# Patient Record
Sex: MALE | Race: BLACK OR AFRICAN AMERICAN | NOT HISPANIC OR LATINO | URBAN - METROPOLITAN AREA
[De-identification: names, ages, dates, MRNs, and addresses within clinical notes are randomized per-mention and may not be internally consistent; named-entity substitution may affect disease eponyms.]

---

## 2020-10-20 ENCOUNTER — LAB VISIT (OUTPATIENT)
Dept: URGENT CARE | Facility: CLINIC | Age: 55
End: 2020-10-20
Payer: COMMERCIAL

## 2020-10-20 DIAGNOSIS — U07.1 COVID-19: Primary | ICD-10-CM

## 2020-10-20 PROCEDURE — U0003 INFECTIOUS AGENT DETECTION BY NUCLEIC ACID (DNA OR RNA); SEVERE ACUTE RESPIRATORY SYNDROME CORONAVIRUS 2 (SARS-COV-2) (CORONAVIRUS DISEASE [COVID-19]), AMPLIFIED PROBE TECHNIQUE, MAKING USE OF HIGH THROUGHPUT TECHNOLOGIES AS DESCRIBED BY CMS-2020-01-R: HCPCS

## 2020-10-21 LAB — SARS-COV-2 RNA RESP QL NAA+PROBE: NOT DETECTED

## 2020-12-14 ENCOUNTER — LAB VISIT (OUTPATIENT)
Dept: PRIMARY CARE CLINIC | Facility: CLINIC | Age: 55
End: 2020-12-14
Payer: COMMERCIAL

## 2020-12-14 DIAGNOSIS — Z20.822 EXPOSURE TO COVID-19 VIRUS: Primary | ICD-10-CM

## 2020-12-14 DIAGNOSIS — R05.9 COUGH: ICD-10-CM

## 2020-12-14 PROCEDURE — U0003 INFECTIOUS AGENT DETECTION BY NUCLEIC ACID (DNA OR RNA); SEVERE ACUTE RESPIRATORY SYNDROME CORONAVIRUS 2 (SARS-COV-2) (CORONAVIRUS DISEASE [COVID-19]), AMPLIFIED PROBE TECHNIQUE, MAKING USE OF HIGH THROUGHPUT TECHNOLOGIES AS DESCRIBED BY CMS-2020-01-R: HCPCS

## 2020-12-15 LAB — SARS-COV-2 RNA RESP QL NAA+PROBE: NOT DETECTED

## 2021-04-26 ENCOUNTER — PATIENT MESSAGE (OUTPATIENT)
Dept: RESEARCH | Facility: HOSPITAL | Age: 56
End: 2021-04-26

## 2021-08-11 ENCOUNTER — CLINICAL SUPPORT (OUTPATIENT)
Dept: URGENT CARE | Facility: CLINIC | Age: 56
End: 2021-08-11
Payer: COMMERCIAL

## 2021-08-11 DIAGNOSIS — Z20.822 EXPOSURE TO COVID-19 VIRUS: Primary | ICD-10-CM

## 2021-08-11 LAB
CTP QC/QA: YES
SARS-COV-2 RDRP RESP QL NAA+PROBE: NEGATIVE

## 2021-08-11 PROCEDURE — U0002: ICD-10-PCS | Mod: QW,S$GLB,, | Performed by: FAMILY MEDICINE

## 2021-08-11 PROCEDURE — U0002 COVID-19 LAB TEST NON-CDC: HCPCS | Mod: QW,S$GLB,, | Performed by: FAMILY MEDICINE

## 2021-10-13 ENCOUNTER — CLINICAL SUPPORT (OUTPATIENT)
Dept: URGENT CARE | Facility: CLINIC | Age: 56
End: 2021-10-13
Payer: COMMERCIAL

## 2021-10-13 DIAGNOSIS — Z20.822 ENCOUNTER FOR LABORATORY TESTING FOR COVID-19 VIRUS: ICD-10-CM

## 2021-10-13 LAB — SARS-COV-2 RNA RESP QL NAA+PROBE: NOT DETECTED

## 2021-10-13 PROCEDURE — U0003 INFECTIOUS AGENT DETECTION BY NUCLEIC ACID (DNA OR RNA); SEVERE ACUTE RESPIRATORY SYNDROME CORONAVIRUS 2 (SARS-COV-2) (CORONAVIRUS DISEASE [COVID-19]), AMPLIFIED PROBE TECHNIQUE, MAKING USE OF HIGH THROUGHPUT TECHNOLOGIES AS DESCRIBED BY CMS-2020-01-R: HCPCS | Performed by: PHYSICIAN ASSISTANT

## 2021-10-13 PROCEDURE — U0005 INFEC AGEN DETEC AMPLI PROBE: HCPCS | Performed by: PHYSICIAN ASSISTANT

## 2021-11-09 ENCOUNTER — CLINICAL SUPPORT (OUTPATIENT)
Dept: URGENT CARE | Facility: CLINIC | Age: 56
End: 2021-11-09
Payer: COMMERCIAL

## 2021-11-09 DIAGNOSIS — U07.1 COVID-19: Primary | ICD-10-CM

## 2021-11-09 LAB — SARS-COV-2 RNA RESP QL NAA+PROBE: NOT DETECTED

## 2021-11-09 PROCEDURE — U0003 INFECTIOUS AGENT DETECTION BY NUCLEIC ACID (DNA OR RNA); SEVERE ACUTE RESPIRATORY SYNDROME CORONAVIRUS 2 (SARS-COV-2) (CORONAVIRUS DISEASE [COVID-19]), AMPLIFIED PROBE TECHNIQUE, MAKING USE OF HIGH THROUGHPUT TECHNOLOGIES AS DESCRIBED BY CMS-2020-01-R: HCPCS | Performed by: FAMILY MEDICINE

## 2021-11-09 PROCEDURE — U0005 INFEC AGEN DETEC AMPLI PROBE: HCPCS | Performed by: FAMILY MEDICINE

## 2022-04-04 ENCOUNTER — TELEPHONE (OUTPATIENT)
Dept: UROLOGY | Facility: CLINIC | Age: 57
End: 2022-04-04
Payer: COMMERCIAL

## 2022-04-04 DIAGNOSIS — Z12.5 ENCOUNTER FOR PROSTATE CANCER SCREENING: Primary | ICD-10-CM

## 2023-08-15 ENCOUNTER — HOSPITAL ENCOUNTER (EMERGENCY)
Facility: HOSPITAL | Age: 58
Discharge: HOME | End: 2023-08-16
Attending: EMERGENCY MEDICINE
Payer: COMMERCIAL

## 2023-08-15 DIAGNOSIS — R07.9 CHEST PAIN: ICD-10-CM

## 2023-08-15 LAB
BASOPHILS # BLD AUTO: 0.1 K/UL (ref 0–0.2)
BASOPHILS NFR BLD: 1.3 % (ref 0–1.9)
DIFFERENTIAL METHOD: ABNORMAL
EOSINOPHIL # BLD AUTO: 0.2 K/UL (ref 0–0.5)
EOSINOPHIL NFR BLD: 1.9 % (ref 0–8)
ERYTHROCYTE [DISTWIDTH] IN BLOOD BY AUTOMATED COUNT: 13.2 % (ref 11.5–14.5)
HCT VFR BLD AUTO: 37 % (ref 40–54)
HGB BLD-MCNC: 12.3 G/DL (ref 14–18)
IMM GRANULOCYTES # BLD AUTO: 0.02 K/UL (ref 0–0.04)
IMM GRANULOCYTES NFR BLD AUTO: 0.3 % (ref 0–0.5)
LYMPHOCYTES # BLD AUTO: 3.2 K/UL (ref 1–4.8)
LYMPHOCYTES NFR BLD: 40.2 % (ref 18–48)
MCH RBC QN AUTO: 34.6 PG (ref 27–31)
MCHC RBC AUTO-ENTMCNC: 33.2 G/DL (ref 32–36)
MCV RBC AUTO: 104 FL (ref 82–98)
MONOCYTES # BLD AUTO: 0.6 K/UL (ref 0.3–1)
MONOCYTES NFR BLD: 7.4 % (ref 4–15)
NEUTROPHILS # BLD AUTO: 3.9 K/UL (ref 1.8–7.7)
NEUTROPHILS NFR BLD: 48.9 % (ref 38–73)
NRBC BLD-RTO: 0 /100 WBC
PLATELET # BLD AUTO: 235 K/UL (ref 150–450)
PMV BLD AUTO: 9 FL (ref 9.2–12.9)
POC CARDIAC TROPONIN I: 0 NG/ML (ref 0–0.08)
RBC # BLD AUTO: 3.56 M/UL (ref 4.6–6.2)
SAMPLE: NORMAL
WBC # BLD AUTO: 7.99 K/UL (ref 3.9–12.7)

## 2023-08-15 PROCEDURE — 99285 EMERGENCY DEPT VISIT HI MDM: CPT | Mod: 25

## 2023-08-15 PROCEDURE — 85025 COMPLETE CBC W/AUTO DIFF WBC: CPT

## 2023-08-15 PROCEDURE — 86803 HEPATITIS C AB TEST: CPT | Performed by: PHYSICIAN ASSISTANT

## 2023-08-15 PROCEDURE — 80053 COMPREHEN METABOLIC PANEL: CPT

## 2023-08-15 PROCEDURE — 87389 HIV-1 AG W/HIV-1&-2 AB AG IA: CPT | Performed by: PHYSICIAN ASSISTANT

## 2023-08-15 PROCEDURE — 84484 ASSAY OF TROPONIN QUANT: CPT

## 2023-08-15 PROCEDURE — 25000003 PHARM REV CODE 250

## 2023-08-15 PROCEDURE — 83880 ASSAY OF NATRIURETIC PEPTIDE: CPT

## 2023-08-15 PROCEDURE — 93005 ELECTROCARDIOGRAM TRACING: CPT

## 2023-08-15 PROCEDURE — 93010 ELECTROCARDIOGRAM REPORT: CPT | Mod: ,,, | Performed by: INTERNAL MEDICINE

## 2023-08-15 PROCEDURE — 93010 EKG 12-LEAD: ICD-10-PCS | Mod: ,,, | Performed by: INTERNAL MEDICINE

## 2023-08-15 RX ORDER — ASPIRIN 325 MG
325 TABLET ORAL
Status: COMPLETED | OUTPATIENT
Start: 2023-08-15 | End: 2023-08-15

## 2023-08-15 RX ADMIN — ASPIRIN 325 MG ORAL TABLET 325 MG: 325 PILL ORAL at 11:08

## 2023-08-16 VITALS
HEART RATE: 88 BPM | DIASTOLIC BLOOD PRESSURE: 69 MMHG | OXYGEN SATURATION: 99 % | WEIGHT: 260 LBS | RESPIRATION RATE: 16 BRPM | SYSTOLIC BLOOD PRESSURE: 127 MMHG | TEMPERATURE: 98 F

## 2023-08-16 LAB
ALBUMIN SERPL BCP-MCNC: 3.7 G/DL (ref 3.5–5.2)
ALP SERPL-CCNC: 97 U/L (ref 55–135)
ALT SERPL W/O P-5'-P-CCNC: 91 U/L (ref 10–44)
ANION GAP SERPL CALC-SCNC: 17 MMOL/L (ref 8–16)
AST SERPL-CCNC: 137 U/L (ref 10–40)
BILIRUB SERPL-MCNC: 0.7 MG/DL (ref 0.1–1)
BNP SERPL-MCNC: 38 PG/ML (ref 0–99)
BUN SERPL-MCNC: 15 MG/DL (ref 6–20)
CALCIUM SERPL-MCNC: 9.4 MG/DL (ref 8.7–10.5)
CHLORIDE SERPL-SCNC: 105 MMOL/L (ref 95–110)
CO2 SERPL-SCNC: 21 MMOL/L (ref 23–29)
CREAT SERPL-MCNC: 1.5 MG/DL (ref 0.5–1.4)
EST. GFR  (NO RACE VARIABLE): 53.3 ML/MIN/1.73 M^2
GLUCOSE SERPL-MCNC: 116 MG/DL (ref 70–110)
HCV AB SERPL QL IA: NORMAL
HIV 1+2 AB+HIV1 P24 AG SERPL QL IA: NORMAL
POTASSIUM SERPL-SCNC: 4 MMOL/L (ref 3.5–5.1)
PROT SERPL-MCNC: 8.1 G/DL (ref 6–8.4)
SODIUM SERPL-SCNC: 143 MMOL/L (ref 136–145)
TROPONIN I SERPL DL<=0.01 NG/ML-MCNC: 0.01 NG/ML (ref 0–0.03)
TROPONIN I SERPL DL<=0.01 NG/ML-MCNC: <0.006 NG/ML (ref 0–0.03)

## 2023-08-16 PROCEDURE — 84484 ASSAY OF TROPONIN QUANT: CPT

## 2023-08-16 PROCEDURE — 25000003 PHARM REV CODE 250

## 2023-08-16 PROCEDURE — 96360 HYDRATION IV INFUSION INIT: CPT

## 2023-08-16 PROCEDURE — 96361 HYDRATE IV INFUSION ADD-ON: CPT

## 2023-08-16 RX ADMIN — SODIUM CHLORIDE 1000 ML: 0.9 INJECTION, SOLUTION INTRAVENOUS at 12:08

## 2023-08-16 NOTE — DISCHARGE INSTRUCTIONS

## 2023-08-16 NOTE — EKG INTERPRETATIONS - EMERGENCY DEPT.
Independently interpreted by MD:  Rate 90, NSR, no stemi, no ectopy, no hypertrophy, 1st degree AV block, poor qrs progression in V3-4

## 2023-08-16 NOTE — ED PROVIDER NOTES
Encounter Date: 8/15/2023       History     Chief Complaint   Patient presents with    Chest Pain     Midsternal chest pain occurred 2 hours ago. Pt in no current pain. -SOB.       57-year-old male with no known medical history presents emergency department  with episode of mid sternal chest discomfort lasting 10 minutes and self resolving  2 hours prior to arrival.  Patient reports experiencing symptoms of indigestion with belching and flatus at the time.   Patient reports telling a friend of his who recommended he presents to the emergency department.  Patient reports he was driving his car when symptoms onset occurred.  He denies experiencing associated shortness a breath, nausea, vomiting diaphoresis or abdominal discomfort.  Patient presents to the ED asymptomatic      Review of patient's allergies indicates:  No Known Allergies  No past medical history on file.  Past Surgical History:   Procedure Laterality Date    KNEE ARTHROPLASTY Right 09/1998     No family history on file.  Social History     Tobacco Use    Smoking status: Never    Smokeless tobacco: Never   Substance Use Topics    Alcohol use: Yes     Alcohol/week: 12.0 standard drinks of alcohol     Types: 10 Glasses of wine, 2 Shots of liquor per week    Drug use: Never     Review of Systems   Constitutional:  Negative for fever.   Respiratory:  Negative for cough, chest tightness and shortness of breath.    Cardiovascular:  Positive for chest pain.   Gastrointestinal:  Negative for abdominal pain.   Musculoskeletal:  Negative for arthralgias.   Skin:  Negative for color change.   Allergic/Immunologic: Negative for immunocompromised state.       Physical Exam     Initial Vitals [08/15/23 2238]   BP Pulse Resp Temp SpO2   (!) 158/89 90 16 97.7 °F (36.5 °C) 100 %      MAP       --         Physical Exam    Vitals reviewed.  Constitutional: He appears well-developed and well-nourished.   HENT:   Head: Normocephalic and atraumatic.   Eyes: Conjunctivae and  EOM are normal.   Neck:   Normal range of motion.  Cardiovascular:  Normal rate and normal heart sounds.           Pulmonary/Chest: Breath sounds normal. No respiratory distress. He has no wheezes. He has no rales.   Abdominal: Abdomen is soft. He exhibits no distension. There is no abdominal tenderness. There is no rebound.   Musculoskeletal:         General: Normal range of motion.      Cervical back: Normal range of motion.     Neurological: He is alert and oriented to person, place, and time.   Skin: Skin is warm and dry.   Psychiatric: He has a normal mood and affect. Thought content normal.         ED Course   Procedures  Labs Reviewed   CBC W/ AUTO DIFFERENTIAL - Abnormal; Notable for the following components:       Result Value    RBC 3.56 (*)     Hemoglobin 12.3 (*)     Hematocrit 37.0 (*)      (*)     MCH 34.6 (*)     MPV 9.0 (*)     All other components within normal limits    Narrative:     Release to patient->Immediate   COMPREHENSIVE METABOLIC PANEL - Abnormal; Notable for the following components:    CO2 21 (*)     Glucose 116 (*)     Creatinine 1.5 (*)      (*)     ALT 91 (*)     eGFR 53.3 (*)     Anion Gap 17 (*)     All other components within normal limits    Narrative:     Release to patient->Immediate   TROPONIN I    Narrative:     Release to patient->Immediate   B-TYPE NATRIURETIC PEPTIDE    Narrative:     Release to patient->Immediate   TROPONIN ISTAT   HIV 1 / 2 ANTIBODY   HEPATITIS C ANTIBODY   POCT TROPONIN   POCT TROPONIN          Imaging Results              X-Ray Chest AP Portable (Final result)  Result time 08/16/23 00:18:20      Final result by Cyril Wing MD (08/16/23 00:18:20)                   Impression:      No evidence of acute chest disease radiographically.      Electronically signed by: Cyril Wing  Date:    08/16/2023  Time:    00:18               Narrative:    EXAMINATION:  XR CHEST AP PORTABLE    CLINICAL HISTORY:  Chest  Pain;    TECHNIQUE:  Single frontal view of the chest was performed.    COMPARISON:  None    FINDINGS:  The heart is not enlarged the trachea is midline.  The lungs pleural spaces appear clear.  Metallic opacity projects over the lower cervical spine etiology unclear.  Bony structures are intact.                                       Medications   aspirin tablet 325 mg (325 mg Oral Given 8/15/23 2328)     Medical Decision Making:   Differential Diagnosis:    GERD, ACS no concern of arrhythmia ,  PE  also not consistent with presentation  Clinical Tests:   Lab Tests: Ordered  Radiological Study: Ordered  ED Management:   57-year-old male presents emergency department due to midsternal chest discomfort that is since resolved.  Labs and imaging ordered.  Unclear if patient follow up with primary care  however no documented comorbidity. Patient does appear to have been drinking alcohol earlier today  today's process  could possibly be caused by GERD.   Patient is not experiencing chest discomfort in emergency department   EKG performed shows sinus rhythm with first-degree block,  nonspecific inverted T-waves seen in lead 3.  We will trend 2 troponin due to recent onset of discomfort.  Initial troponin WNL    I am signing out patient to  night physician pending dispo and 2nd troponin  results               ED Course as of 08/16/23 0020   Wed Aug 16, 2023   0017 Troponin I #1 [CR]      ED Course User Index  [CR] Marlene Rojas PA-C                 Clinical Impression:   Final diagnoses:  [R07.9] Chest pain               Marlene Rojas PA-C  08/16/23 0020

## 2023-08-16 NOTE — ED PROVIDER NOTES
I assumed care of this patient from the outgoing provider at shift change.  At that time patient was pending a repeat troponin which was within normal ranges.  Patient has remained chest pain-free while in the ED. vital signs normal, patient is stable for discharge, advised to follow-up with PCP     Alex Bledsoe PA-C  08/16/23 [id number]

## 2023-08-16 NOTE — ED TRIAGE NOTES
Martín Thomason, a 57 y.o. male presents to the ED w/ complaint of midsternal chest pain that started about an hour ago. Pt states that the pain has subsided after he passed gas but wanted to come get checked out anyway.    Triage note:  Chief Complaint   Patient presents with    Chest Pain     Midsternal chest pain occurred 2 hours ago. Pt in no current pain. -SOB.      Review of patient's allergies indicates:  Not on File  No past medical history on file.     Patient identifiers for Martín Thomason checked and correct.  LOC: Patient is awake, alert, and aware of environment with an appropriate affect. Patient is oriented x 3 and speaking appropriately.  APPEARANCE: Patient resting comfortably and in no acute distress. Patient is clean and well groomed, patient's clothing is properly fastened.  SKIN: The skin is warm and dry. Patient has normal skin turgor and moist mucus membrances. Skin is intact; no bruising or breakdown noted.  MUSKULOSKELETAL: Patient is moving all extremities well, no obvious deformities noted. Pulses intact.   RESPIRATORY: Airway is open and patent. Respirations are spontaneous and non-labored with normal effort and rate.  CARDIAC: Patient has a normal rate and rhythm. No peripheral edema noted. Capillary refill < 3 seconds. Pt states that he had midsternal chest pain that has now subsided.  ABDOMEN: No distention noted. Bowel sounds active in all 4 quadrants. Soft and non-tender upon palpation.  NEUROLOGICAL: PERRL. Facial expression is symmetrical. Hand grasps are equal bilaterally. Normal sensation in all extremities when touched with finger.  Allergies reported: Review of patient's allergies indicates:  No Known Allergies

## 2023-11-27 ENCOUNTER — OFFICE VISIT (OUTPATIENT)
Dept: URGENT CARE | Facility: CLINIC | Age: 58
End: 2023-11-27
Payer: COMMERCIAL

## 2023-11-27 VITALS
TEMPERATURE: 98 F | HEART RATE: 110 BPM | SYSTOLIC BLOOD PRESSURE: 150 MMHG | DIASTOLIC BLOOD PRESSURE: 100 MMHG | WEIGHT: 282.44 LBS | RESPIRATION RATE: 20 BRPM | OXYGEN SATURATION: 97 %

## 2023-11-27 DIAGNOSIS — K42.9 UMBILICAL HERNIA WITHOUT OBSTRUCTION AND WITHOUT GANGRENE: ICD-10-CM

## 2023-11-27 DIAGNOSIS — R25.1 SHAKINESS: ICD-10-CM

## 2023-11-27 DIAGNOSIS — R00.0 TACHYCARDIA: ICD-10-CM

## 2023-11-27 DIAGNOSIS — R11.10 VOMITING IN ADULT: ICD-10-CM

## 2023-11-27 DIAGNOSIS — J06.9 VIRAL URI WITH COUGH: ICD-10-CM

## 2023-11-27 DIAGNOSIS — R42 VERTIGO: Primary | ICD-10-CM

## 2023-11-27 LAB
CTP QC/QA: YES
CTP QC/QA: YES
GLUCOSE SERPL-MCNC: 148 MG/DL (ref 70–110)
POC MOLECULAR INFLUENZA A AGN: NEGATIVE
POC MOLECULAR INFLUENZA B AGN: NEGATIVE
SARS-COV-2 AG RESP QL IA.RAPID: NEGATIVE

## 2023-11-27 PROCEDURE — 87502 POCT INFLUENZA A/B MOLECULAR: ICD-10-PCS | Mod: QW,S$GLB,, | Performed by: NURSE PRACTITIONER

## 2023-11-27 PROCEDURE — 99204 OFFICE O/P NEW MOD 45 MIN: CPT | Mod: S$GLB,,, | Performed by: NURSE PRACTITIONER

## 2023-11-27 PROCEDURE — 87811 SARS-COV-2 COVID19 W/OPTIC: CPT | Mod: QW,S$GLB,, | Performed by: NURSE PRACTITIONER

## 2023-11-27 PROCEDURE — 87811 SARS CORONAVIRUS 2 ANTIGEN POCT, MANUAL READ: ICD-10-PCS | Mod: QW,S$GLB,, | Performed by: NURSE PRACTITIONER

## 2023-11-27 PROCEDURE — 99204 PR OFFICE/OUTPT VISIT, NEW, LEVL IV, 45-59 MIN: ICD-10-PCS | Mod: S$GLB,,, | Performed by: NURSE PRACTITIONER

## 2023-11-27 PROCEDURE — 82962 POCT GLUCOSE, HAND-HELD DEVICE: ICD-10-PCS | Mod: S$GLB,,, | Performed by: NURSE PRACTITIONER

## 2023-11-27 PROCEDURE — 87502 INFLUENZA DNA AMP PROBE: CPT | Mod: QW,S$GLB,, | Performed by: NURSE PRACTITIONER

## 2023-11-27 PROCEDURE — 82962 GLUCOSE BLOOD TEST: CPT | Mod: S$GLB,,, | Performed by: NURSE PRACTITIONER

## 2023-11-27 RX ORDER — PROMETHAZINE HYDROCHLORIDE 25 MG/1
25 TABLET ORAL EVERY 8 HOURS PRN
Qty: 30 TABLET | Refills: 0 | Status: SHIPPED | OUTPATIENT
Start: 2023-11-27

## 2023-11-27 RX ORDER — SODIUM CHLORIDE 9 MG/ML
INJECTION, SOLUTION INTRAVENOUS ONCE
Status: COMPLETED | OUTPATIENT
Start: 2023-11-27 | End: 2023-11-27

## 2023-11-27 RX ORDER — BENZONATATE 200 MG/1
200 CAPSULE ORAL EVERY 8 HOURS PRN
Qty: 30 CAPSULE | Refills: 0 | Status: SHIPPED | OUTPATIENT
Start: 2023-11-27

## 2023-11-27 RX ORDER — HYDROCHLOROTHIAZIDE 12.5 MG/1
12.5 TABLET ORAL DAILY
COMMUNITY

## 2023-11-27 RX ADMIN — SODIUM CHLORIDE 1000 ML: 9 INJECTION, SOLUTION INTRAVENOUS at 12:11

## 2023-11-27 NOTE — PROGRESS NOTES
Subjective:      Patient ID: Martín Thomason is a 57 y.o. male.    Vitals:  weight is 128.1 kg (282 lb 6.6 oz). His temperature is 98 °F (36.7 °C). His blood pressure is 150/100 (abnormal) and his pulse is 110. His respiration is 20 and oxygen saturation is 97%.     Chief Complaint: Emesis    57 yr old male presents to  today with c/o sore throat, vomiting, shakiness, and chills. Pt stated that his symptoms began five days ago. He vomited x1, but very large in amount. Denies chest pain and back pain. Denies abdominal pain. Reports abdominal hernia that does not hurt him, and that he is monitoring. Elevated HP and BP noted. He states that he did not take his BP medicine today.  Denies confusion, blurry vision, and headache. Denies diarrhea.  Dizziness worsens when he lies down.     Emesis   This is a new problem. The current episode started today. The problem occurs less than 2 times per day. The problem has been resolved. There has been no fever. Associated symptoms include chills, coughing, dizziness, headaches and URI. Pertinent negatives include no abdominal pain, chest pain, diarrhea, fever or sweats. Treatments tried: musinex.       Constitution: Positive for chills. Negative for fever.   HENT:  Positive for sore throat. Negative for trouble swallowing.    Cardiovascular:  Negative for chest pain.   Respiratory:  Positive for cough. Negative for shortness of breath and wheezing.    Gastrointestinal:  Positive for nausea and vomiting. Negative for abdominal pain and diarrhea.   Genitourinary:  Negative for dysuria and flank pain.   Musculoskeletal:  Negative for back pain.   Skin:  Negative for rash.   Neurological:  Positive for dizziness, light-headedness and headaches. Negative for history of vertigo, passing out, facial drooping, speech difficulty, coordination disturbances, loss of balance, history of migraines, disorientation, altered mental status, loss of consciousness, numbness, tingling and  seizures.   Psychiatric/Behavioral:  Negative for altered mental status, disorientation, confusion and agitation.       Objective:     Physical Exam   Constitutional: He is oriented to person, place, and time.  Non-toxic appearance. He does not appear ill. No distress.      Comments:Good activity level. NAD. Appears stable.      HENT:   Head: Normocephalic.   Ears:   Right Ear: Tympanic membrane, external ear and ear canal normal.   Left Ear: Tympanic membrane, external ear and ear canal normal.   Nose: Nose normal.   Mouth/Throat: Mucous membranes are moist. No oropharyngeal exudate or posterior oropharyngeal erythema. Oropharynx is clear.   Eyes: Right eye exhibits no discharge. Left eye exhibits no discharge.   Neck: Neck supple. No neck rigidity present.   Cardiovascular: Regular rhythm. Tachycardia present.   Pulmonary/Chest: Effort normal and breath sounds normal. No stridor. No respiratory distress. He has no wheezes. He has no rhonchi.   Abdominal: Normal appearance. He exhibits distension. There is no abdominal tenderness. There is no guarding, no left CVA tenderness and no right CVA tenderness. A hernia is present.   Musculoskeletal: Normal range of motion.         General: Normal range of motion.   Neurological: He is alert and oriented to person, place, and time. GCS eye subscore is 4. GCS verbal subscore is 5. GCS motor subscore is 6.   Skin: Skin is warm, dry, not diaphoretic, not pale and no rash. Capillary refill takes less than 2 seconds.   Psychiatric: His behavior is normal. Mood normal.   Nursing note and vitals reviewed.      EKG: sinus tachycardia    Orthostatic VS results:   Lyin/107, 121  Standin/92, 125  Continuance of Standin/100, 110     Results for orders placed or performed in visit on 23   POCT Influenza A/B MOLECULAR   Result Value Ref Range    POC Molecular Influenza A Ag Negative Negative, Not Reported    POC Molecular Influenza B Ag Negative Negative, Not  Reported     Acceptable Yes    SARS Coronavirus 2 Antigen, POCT Manual Read   Result Value Ref Range    SARS Coronavirus 2 Antigen Negative Negative     Acceptable Yes    POCT Glucose, Hand-Held Device   Result Value Ref Range    POC Glucose 148 (A) 70 - 110 MG/DL        Assessment:     1. Vertigo    2. Viral URI with cough    3. Shakiness    4. Vomiting in adult    5. Umbilical hernia without obstruction and without gangrene    6. Tachycardia        EKG: sinus tachycardia     Plan:   Pt feels better after 1L NS bolus     Vertigo  -     IN OFFICE EKG 12-LEAD (to Muse)  -     Orthostatic vital signs  -     Ambulatory referral/consult to Internal Medicine  -     promethazine (PHENERGAN) 25 MG tablet; Take 1 tablet (25 mg total) by mouth every 8 (eight) hours as needed (nausea, vomiting, or dizziness).  Dispense: 30 tablet; Refill: 0  -     0.9%  NaCl infusion    Viral URI with cough  -     POCT Influenza A/B MOLECULAR  -     SARS Coronavirus 2 Antigen, POCT Manual Read  -     Ambulatory referral/consult to Internal Medicine  -     benzonatate (TESSALON) 200 MG capsule; Take 1 capsule (200 mg total) by mouth every 8 (eight) hours as needed for Cough.  Dispense: 30 capsule; Refill: 0  -     promethazine (PHENERGAN) 25 MG tablet; Take 1 tablet (25 mg total) by mouth every 8 (eight) hours as needed (nausea, vomiting, or dizziness).  Dispense: 30 tablet; Refill: 0    Shakiness  -     POCT Glucose, Hand-Held Device  -     IN OFFICE EKG 12-LEAD (to Muse)  -     Orthostatic vital signs  -     Ambulatory referral/consult to Internal Medicine  -     0.9%  NaCl infusion    Vomiting in adult  -     POCT Glucose, Hand-Held Device  -     Ambulatory referral/consult to Internal Medicine  -     promethazine (PHENERGAN) 25 MG tablet; Take 1 tablet (25 mg total) by mouth every 8 (eight) hours as needed (nausea, vomiting, or dizziness).  Dispense: 30 tablet; Refill: 0  -     0.9%  NaCl infusion    Umbilical  hernia without obstruction and without gangrene  -     Ambulatory referral/consult to Internal Medicine    Tachycardia  -     POCT Glucose, Hand-Held Device  -     IN OFFICE EKG 12-LEAD (to Muse)  -     Orthostatic vital signs  -     Ambulatory referral/consult to Internal Medicine  -     Ambulatory referral/consult to Cardiology  -     0.9%  NaCl infusion      Patient Instructions   Seek ER care if your symptoms worsen   Please follow up with a primary care doctor  Monitor your blood pressure and heart rate at home. If your blood pressure gets above 180 for top number and/or 120 for bottom number, seek ER care  If your hear rate remains elevated (above 100) after rehydrating yourself, and after pain is controlled (sore throat and cold symptoms), seek ER care     A referral for primary care and cardiology have been placed.   [facility] should call you to set up appointments. If you do not hear from [facility], please call [phone] to make both appointments   Remain compliant with blood pressure medicaitons     Tylenol for pain (for sore throat)   Oral fluids, (hot tea with honey helps with sore throat and cough)  Rest  Change positions slowly to help decrease dizziness

## 2023-11-27 NOTE — PATIENT INSTRUCTIONS
Seek ER care if your symptoms worsen   Please follow up with a primary care doctor  Monitor your blood pressure and heart rate at home. If your blood pressure gets above 180 for top number and/or 120 for bottom number, seek ER care  If your hear rate remains elevated (above 100) after rehydrating yourself, and after pain is controlled (sore throat and cold symptoms), seek ER care     A referral for primary care and cardiology have been placed.   [facility] should call you to set up appointments. If you do not hear from [facility], please call [phone] to make both appointments   Remain compliant with blood pressure medicaitons     Tylenol for pain (for sore throat)   Oral fluids, (hot tea with honey helps with sore throat and cough)  Rest  Change positions slowly to help decrease dizziness

## 2023-11-27 NOTE — PROGRESS NOTES
Subjective:      Patient ID: Martín Thomason is a 57 y.o. male.    Vitals:  vitals were not taken for this visit.     Chief Complaint: Emesis    57 yr old pt presents vomiting/chills     Emesis     Gastrointestinal:  Positive for vomiting.    Objective:     Physical Exam    Assessment:     No diagnosis found.    Plan:       There are no diagnoses linked to this encounter.

## 2023-12-09 ENCOUNTER — ON-DEMAND VIRTUAL (OUTPATIENT)
Dept: URGENT CARE | Facility: CLINIC | Age: 58
End: 2023-12-09
Payer: COMMERCIAL

## 2023-12-09 DIAGNOSIS — Z20.828 EXPOSURE TO INFLUENZA: Primary | ICD-10-CM

## 2023-12-09 DIAGNOSIS — Z78.9 ANTIVIRAL PROPHYLAXIS RECOMMENDED: ICD-10-CM

## 2023-12-09 PROCEDURE — 99213 OFFICE O/P EST LOW 20 MIN: CPT | Mod: 95,S$GLB,, | Performed by: NURSE PRACTITIONER

## 2023-12-09 PROCEDURE — 99213 PR OFFICE/OUTPT VISIT, EST, LEVL III, 20-29 MIN: ICD-10-PCS | Mod: 95,S$GLB,, | Performed by: NURSE PRACTITIONER

## 2023-12-09 RX ORDER — OSELTAMIVIR PHOSPHATE 75 MG/1
75 CAPSULE ORAL DAILY
Qty: 7 CAPSULE | Refills: 0 | Status: SHIPPED | OUTPATIENT
Start: 2023-12-09 | End: 2023-12-16

## 2023-12-09 NOTE — PROGRESS NOTES
Subjective:      Patient ID: Martín Thomason is a 57 y.o. male.    Vitals:  vitals were not taken for this visit.     Chief Complaint: Influenza (Wife tested positive for influenza)    57 year old male presents via telehealth with request for flu medication. Reports wife tested positive for influenza.         Constitution: Negative for chills and fever.   HENT:  Negative for congestion and postnasal drip.    Respiratory:  Positive for cough.       Objective:     Physical Exam   Constitutional: He is oriented to person, place, and time. No distress.   HENT:   Head: Normocephalic and atraumatic.   Mouth/Throat: Oropharynx is clear and moist and mucous membranes are normal.   Eyes: No scleral icterus. Extraocular movement intact   Pulmonary/Chest: Effort normal. No respiratory distress.   Musculoskeletal: Normal range of motion.         General: Normal range of motion.   Neurological: He is alert and oriented to person, place, and time.   Skin: Skin is not diaphoretic.   Psychiatric: His behavior is normal. Judgment and thought content normal.   Vitals reviewed.      Assessment:     1. Exposure to influenza    2. Antiviral prophylaxis recommended        Plan:       Exposure to influenza  -     oseltamivir (TAMIFLU) 75 MG capsule; Take 1 capsule (75 mg total) by mouth once daily. for 7 days  Dispense: 7 capsule; Refill: 0    Antiviral prophylaxis recommended      Patient Instructions   Please drink plenty of fluids.  Please get plenty of rest.  Please return here or go to the Emergency Department for any concerns or worsening of condition.  Tamiflu prescription has been discussed and prescribed, please take to completion unless you cannot tolerate the side effects.   It is okay to take plain over the counter Mucinex or Allegra or Claritin or Zyrtec.    If you do have Hypertension or palpitations, it is safe to take Coricidin HBP for relief of sinus symptoms.  If not allergic, please take over the counter Tylenol  (Acetaminophen) and/or Motrin (Ibuprofen) as directed for control of pain and/or fever.  Please follow up with your primary care doctor or specialist as needed.    If you  smoke, please stop smoking.     Recommend flu vaccine

## 2023-12-09 NOTE — PATIENT INSTRUCTIONS
Please drink plenty of fluids.  Please get plenty of rest.  Please return here or go to the Emergency Department for any concerns or worsening of condition.  Tamiflu prescription has been discussed and prescribed, please take to completion unless you cannot tolerate the side effects.   It is okay to take plain over the counter Mucinex or Allegra or Claritin or Zyrtec.    If you do have Hypertension or palpitations, it is safe to take Coricidin HBP for relief of sinus symptoms.  If not allergic, please take over the counter Tylenol (Acetaminophen) and/or Motrin (Ibuprofen) as directed for control of pain and/or fever.  Please follow up with your primary care doctor or specialist as needed.    If you  smoke, please stop smoking.     Recommend flu vaccine

## 2025-01-13 ENCOUNTER — HOSPITAL ENCOUNTER (EMERGENCY)
Facility: HOSPITAL | Age: 60
Discharge: HOME | End: 2025-01-14
Attending: STUDENT IN AN ORGANIZED HEALTH CARE EDUCATION/TRAINING PROGRAM
Payer: COMMERCIAL

## 2025-01-13 DIAGNOSIS — R07.9 CHEST PAIN: ICD-10-CM

## 2025-01-13 DIAGNOSIS — S01.112A EYEBROW LACERATION, LEFT, INITIAL ENCOUNTER: ICD-10-CM

## 2025-01-13 DIAGNOSIS — R40.20 LOC (LOSS OF CONSCIOUSNESS): Primary | ICD-10-CM

## 2025-01-13 DIAGNOSIS — S09.90XA CLOSED HEAD INJURY, INITIAL ENCOUNTER: ICD-10-CM

## 2025-01-13 LAB
ALBUMIN SERPL BCP-MCNC: 3.5 G/DL (ref 3.5–5.2)
ALP SERPL-CCNC: 92 U/L (ref 40–150)
ALT SERPL W/O P-5'-P-CCNC: 61 U/L (ref 10–44)
ANION GAP SERPL CALC-SCNC: 14 MMOL/L (ref 8–16)
AST SERPL-CCNC: 111 U/L (ref 10–40)
BASOPHILS # BLD AUTO: 0.06 K/UL (ref 0–0.2)
BASOPHILS NFR BLD: 0.7 % (ref 0–1.9)
BILIRUB SERPL-MCNC: 0.4 MG/DL (ref 0.1–1)
BUN SERPL-MCNC: 12 MG/DL (ref 8–23)
CALCIUM SERPL-MCNC: 9 MG/DL (ref 8.7–10.5)
CHLORIDE SERPL-SCNC: 103 MMOL/L (ref 95–110)
CO2 SERPL-SCNC: 19 MMOL/L (ref 23–29)
CREAT SERPL-MCNC: 1.7 MG/DL (ref 0.5–1.4)
D DIMER PPP IA.FEU-MCNC: 0.48 MG/L FEU
DIFFERENTIAL METHOD BLD: ABNORMAL
EOSINOPHIL # BLD AUTO: 0.1 K/UL (ref 0–0.5)
EOSINOPHIL NFR BLD: 1.1 % (ref 0–8)
ERYTHROCYTE [DISTWIDTH] IN BLOOD BY AUTOMATED COUNT: 15.7 % (ref 11.5–14.5)
EST. GFR  (NO RACE VARIABLE): 45.3 ML/MIN/1.73 M^2
GLUCOSE SERPL-MCNC: 111 MG/DL (ref 70–110)
HCT VFR BLD AUTO: 27.8 % (ref 40–54)
HCV AB SERPL QL IA: NORMAL
HGB BLD-MCNC: 8.5 G/DL (ref 14–18)
HIV 1+2 AB+HIV1 P24 AG SERPL QL IA: NORMAL
IMM GRANULOCYTES # BLD AUTO: 0.03 K/UL (ref 0–0.04)
IMM GRANULOCYTES NFR BLD AUTO: 0.3 % (ref 0–0.5)
LYMPHOCYTES # BLD AUTO: 2.7 K/UL (ref 1–4.8)
LYMPHOCYTES NFR BLD: 30.5 % (ref 18–48)
MCH RBC QN AUTO: 27.8 PG (ref 27–31)
MCHC RBC AUTO-ENTMCNC: 30.6 G/DL (ref 32–36)
MCV RBC AUTO: 91 FL (ref 82–98)
MONOCYTES # BLD AUTO: 0.8 K/UL (ref 0.3–1)
MONOCYTES NFR BLD: 8.6 % (ref 4–15)
NEUTROPHILS # BLD AUTO: 5.2 K/UL (ref 1.8–7.7)
NEUTROPHILS NFR BLD: 58.8 % (ref 38–73)
NRBC BLD-RTO: 0 /100 WBC
PLATELET # BLD AUTO: 293 K/UL (ref 150–450)
PMV BLD AUTO: 9.2 FL (ref 9.2–12.9)
POTASSIUM SERPL-SCNC: 3.8 MMOL/L (ref 3.5–5.1)
PROT SERPL-MCNC: 8.1 G/DL (ref 6–8.4)
RBC # BLD AUTO: 3.06 M/UL (ref 4.6–6.2)
SODIUM SERPL-SCNC: 136 MMOL/L (ref 136–145)
TROPONIN I SERPL DL<=0.01 NG/ML-MCNC: <3 NG/L (ref 0–35)
WBC # BLD AUTO: 8.8 K/UL (ref 3.9–12.7)

## 2025-01-13 PROCEDURE — 85379 FIBRIN DEGRADATION QUANT: CPT | Performed by: STUDENT IN AN ORGANIZED HEALTH CARE EDUCATION/TRAINING PROGRAM

## 2025-01-13 PROCEDURE — 93005 ELECTROCARDIOGRAM TRACING: CPT

## 2025-01-13 PROCEDURE — 90715 TDAP VACCINE 7 YRS/> IM: CPT | Performed by: STUDENT IN AN ORGANIZED HEALTH CARE EDUCATION/TRAINING PROGRAM

## 2025-01-13 PROCEDURE — 96360 HYDRATION IV INFUSION INIT: CPT

## 2025-01-13 PROCEDURE — 85025 COMPLETE CBC W/AUTO DIFF WBC: CPT | Performed by: EMERGENCY MEDICINE

## 2025-01-13 PROCEDURE — 90471 IMMUNIZATION ADMIN: CPT | Performed by: STUDENT IN AN ORGANIZED HEALTH CARE EDUCATION/TRAINING PROGRAM

## 2025-01-13 PROCEDURE — 84484 ASSAY OF TROPONIN QUANT: CPT | Performed by: EMERGENCY MEDICINE

## 2025-01-13 PROCEDURE — 25000003 PHARM REV CODE 250: Performed by: STUDENT IN AN ORGANIZED HEALTH CARE EDUCATION/TRAINING PROGRAM

## 2025-01-13 PROCEDURE — 87389 HIV-1 AG W/HIV-1&-2 AB AG IA: CPT | Performed by: PHYSICIAN ASSISTANT

## 2025-01-13 PROCEDURE — 99285 EMERGENCY DEPT VISIT HI MDM: CPT | Mod: 25

## 2025-01-13 PROCEDURE — 12011 RPR F/E/E/N/L/M 2.5 CM/<: CPT

## 2025-01-13 PROCEDURE — 86803 HEPATITIS C AB TEST: CPT | Performed by: PHYSICIAN ASSISTANT

## 2025-01-13 PROCEDURE — 63600175 PHARM REV CODE 636 W HCPCS: Performed by: STUDENT IN AN ORGANIZED HEALTH CARE EDUCATION/TRAINING PROGRAM

## 2025-01-13 PROCEDURE — 80053 COMPREHEN METABOLIC PANEL: CPT | Performed by: EMERGENCY MEDICINE

## 2025-01-13 PROCEDURE — 93010 ELECTROCARDIOGRAM REPORT: CPT | Mod: ,,, | Performed by: INTERNAL MEDICINE

## 2025-01-13 RX ORDER — LIDOCAINE HYDROCHLORIDE 10 MG/ML
10 INJECTION, SOLUTION EPIDURAL; INFILTRATION; INTRACAUDAL; PERINEURAL
Status: DISCONTINUED | OUTPATIENT
Start: 2025-01-13 | End: 2025-01-14 | Stop reason: HOSPADM

## 2025-01-13 RX ADMIN — Medication: at 11:01

## 2025-01-13 RX ADMIN — CLOSTRIDIUM TETANI TOXOID ANTIGEN (FORMALDEHYDE INACTIVATED), CORYNEBACTERIUM DIPHTHERIAE TOXOID ANTIGEN (FORMALDEHYDE INACTIVATED), BORDETELLA PERTUSSIS TOXOID ANTIGEN (GLUTARALDEHYDE INACTIVATED), BORDETELLA PERTUSSIS FILAMENTOUS HEMAGGLUTININ ANTIGEN (FORMALDEHYDE INACTIVATED), BORDETELLA PERTUSSIS PERTACTIN ANTIGEN, AND BORDETELLA PERTUSSIS FIMBRIAE 2/3 ANTIGEN 0.5 ML: 5; 2; 2.5; 5; 3; 5 INJECTION, SUSPENSION INTRAMUSCULAR at 11:01

## 2025-01-13 RX ADMIN — SODIUM CHLORIDE 500 ML: 0.9 INJECTION, SOLUTION INTRAVENOUS at 09:01

## 2025-01-14 VITALS
OXYGEN SATURATION: 98 % | DIASTOLIC BLOOD PRESSURE: 82 MMHG | SYSTOLIC BLOOD PRESSURE: 121 MMHG | RESPIRATION RATE: 15 BRPM | TEMPERATURE: 98 F | WEIGHT: 245 LBS | BODY MASS INDEX: 33.18 KG/M2 | HEART RATE: 100 BPM | HEIGHT: 72 IN

## 2025-01-14 LAB
OHS QRS DURATION: 88 MS
OHS QTC CALCULATION: 434 MS
TROPONIN I SERPL DL<=0.01 NG/ML-MCNC: <3 NG/L (ref 0–35)

## 2025-01-14 NOTE — FIRST PROVIDER EVALUATION
Medical screening examination initiated.  I have conducted a focused provider triage encounter, findings are as follows:    Brief history of present illness:  syncopal event, fell, hit head on bricks, now with chest pain    There were no vitals filed for this visit.    Pertinent physical exam:  tearful, small visible trauma    Brief workup plan:  cp workup, head ct    Preliminary workup initiated; this workup will be continued and followed by the physician or advanced practice provider that is assigned to the patient when roomed.

## 2025-01-14 NOTE — ED NOTES
"Pt states he got out of the car, suddenly felt dizzy, and fell, hitting his head on some bricks. Pt presents with small laceration above L eye, c/o headache. Denies vision changes or photophobia. Endorses chest pain that started after the fall. Pt states "I think I got out of the truck too fast." Pt states he had x4 alcoholic drinks tonight.   "

## 2025-01-14 NOTE — ED PROVIDER NOTES
"Encounter Date: 1/13/2025       History     Chief Complaint   Patient presents with    Loss of Consciousness     "I blacked out and fell and hit head on bricks" about 30mins ago; laceration to left eye; headache on left side     Chest Pain     Mid sternal started 30 mins ago     59 y.o. male with no significant past medical history presents for fall.  Just prior to arrival, patient was out with friends and had 4 alcoholic beverages.  While getting out of the truck, he felt dizzy and fell down to the ground.  Per his friends, he caught himself with his hands but still struck his head on the ground.  He does report losing consciousness after this.  After regaining consciousness, he reports a vague mid chest pain that lasted a few seconds and has not recurred.  He denies any preceding chest pain or shortness of breath.  He reports a stinging sensation of the cut above his left eye.  He denies any recent illness, neck pain, numbness, weakness, vision changes, nausea/vomiting    The history is provided by the patient and medical records.     Review of patient's allergies indicates:  No Known Allergies  History reviewed. No pertinent past medical history.  Past Surgical History:   Procedure Laterality Date    KNEE ARTHROPLASTY Right 09/1998     No family history on file.  Social History     Tobacco Use    Smoking status: Never    Smokeless tobacco: Never   Substance Use Topics    Alcohol use: Yes     Alcohol/week: 12.0 standard drinks of alcohol     Types: 10 Glasses of wine, 2 Shots of liquor per week    Drug use: Never     Review of Systems   Reason unable to perform ROS: See HPI for relevant ROS.       Physical Exam     Initial Vitals [01/13/25 2029]   BP Pulse Resp Temp SpO2   127/75 (!) 130 (!) 22 97.8 °F (36.6 °C) 100 %      MAP       --         Physical Exam    Nursing note and vitals reviewed.  Constitutional:   Alert, speaking full sentences, no acute distress   Eyes: Conjunctivae and EOM are normal. Pupils are " equal, round, and reactive to light. No scleral icterus.   2.5 cm laceration to the left upper eyelid/eyebrow   Cardiovascular:  Normal rate, regular rhythm and intact distal pulses.           Pulmonary/Chest: Breath sounds normal. No respiratory distress.   Abdominal: Abdomen is soft. He exhibits no distension. There is no abdominal tenderness.   Musculoskeletal:      Comments: No tenderness, step-offs, deformities, or tenderness to the C, T, or L-spine  Normal range of motion of all extremities without pain  No bony tenderness or deformity of the trunk or extremities     Neurological: He is alert and oriented to person, place, and time. He has normal strength. No cranial nerve deficit or sensory deficit.   Skin: Skin is warm and dry.         ED Course   Lac Repair    Date/Time: 1/14/2025 6:16 PM    Performed by: Zeke Pena MD  Authorized by: Zeke Pena MD    Anesthesia:     Anesthesia method:  Topical application    Topical anesthetic:  LET  Laceration details:     Location:  Face    Face location:  L upper eyelid    Extent:  Partial thickness    Length (cm):  2.5  Exploration:     Wound exploration: entire depth of wound visualized      Wound extent: no muscle damage noted    Treatment:     Area cleansed with:  Saline    Amount of cleaning:  Standard    Irrigation solution:  Sterile saline    Irrigation method:  Syringe  Skin repair:     Repair method:  Sutures    Suture size:  5-0    Suture material:  Nylon    Suture technique:  Simple interrupted    Number of sutures:  2  Approximation:     Approximation:  Close  Repair type:     Repair type:  Simple  Post-procedure details:     Dressing:  Open (no dressing)    Procedure completion:  Tolerated well, no immediate complications    Labs Reviewed   CBC W/ AUTO DIFFERENTIAL - Abnormal       Result Value    WBC 8.80      RBC 3.06 (*)     Hemoglobin 8.5 (*)     Hematocrit 27.8 (*)     MCV 91      MCH 27.8      MCHC 30.6 (*)     RDW 15.7 (*)     Platelets  293      MPV 9.2      Immature Granulocytes 0.3      Gran # (ANC) 5.2      Immature Grans (Abs) 0.03      Lymph # 2.7      Mono # 0.8      Eos # 0.1      Baso # 0.06      nRBC 0      Gran % 58.8      Lymph % 30.5      Mono % 8.6      Eosinophil % 1.1      Basophil % 0.7      Differential Method Automated      Narrative:     Release to patient->Immediate   COMPREHENSIVE METABOLIC PANEL - Abnormal    Sodium 136      Potassium 3.8      Chloride 103      CO2 19 (*)     Glucose 111 (*)     BUN 12      Creatinine 1.7 (*)     Calcium 9.0      Total Protein 8.1      Albumin 3.5      Total Bilirubin 0.4      Alkaline Phosphatase 92       (*)     ALT 61 (*)     eGFR 45.3 (*)     Anion Gap 14      Narrative:     Release to patient->Immediate   TROPONIN I HIGH SENSITIVITY    Troponin I High Sensitivity <3      Narrative:     Release to patient->Immediate   HEPATITIS C ANTIBODY    Hepatitis C Ab Non-reactive      Narrative:     Release to patient->Immediate   HIV 1 / 2 ANTIBODY    HIV 1/2 Ag/Ab Non-reactive      Narrative:     Release to patient->Immediate   D DIMER, QUANTITATIVE    D-Dimer 0.48     TROPONIN I HIGH SENSITIVITY    Troponin I High Sensitivity <3          ECG Results              EKG 12-lead (Final result)        Collection Time Result Time QRS Duration OHS QTC Calculation    01/13/25 20:28:52 01/14/25 09:12:32 88 434                     Final result by Interface, Lab In [facility] (01/14/25 09:12:37)                   Narrative:    Test Reason : R07.9,    Vent. Rate :  95 BPM     Atrial Rate :  95 BPM     P-R Int : 224 ms          QRS Dur :  88 ms      QT Int : 346 ms       P-R-T Axes :  31  64  -1 degrees    QTcB Int : 434 ms    Sinus rhythm with 1st degree A-V block  Nonspecific ST and/or T wave abnormalities  Abnormal ECG  When compared with ECG of 15-Aug-2023 22:26,  No significant change was found  Confirmed by Jeffy Loyola ([id number]) on 1/14/2025 9:12:29 AM    Referred By: AAAREFERRAL SELF            Confirmed By: Jeffy Loyola                                  Imaging Results              X-Ray Chest AP Portable (Final result)  Result time 01/13/25 23:48:33      Final result by Bud Michel DO (01/13/25 23:48:33)                   Impression:      No acute abnormality.      Electronically signed by: Bud Michel  Date:    01/13/2025  Time:    23:48               Narrative:    EXAMINATION:  XR CHEST AP PORTABLE    CLINICAL HISTORY:  Chest Pain;    TECHNIQUE:  Single frontal view of the chest was performed.    COMPARISON:  08/15/2023.    FINDINGS:  The lungs are well expanded and clear. No focal opacities are seen. The pleural spaces are clear. The cardiac silhouette is unremarkable. The visualized osseous structures are unremarkable.                                       CT Head Without Contrast (Final result)  Result time 01/13/25 23:33:53      Final result by Jersey Hirsch MD (01/13/25 23:33:53)                   Impression:      No acute intracranial abnormality or depressed calvarial fracture.    Visualized portions of the left preseptal periorbital soft tissues demonstrate some localized swelling.      Electronically signed by: Jersey Hirsch  Date:    01/13/2025  Time:    23:33               Narrative:    EXAMINATION:  CT HEAD WITHOUT CONTRAST    CLINICAL HISTORY:  Head trauma, moderate-severe;    TECHNIQUE:  Low dose axial CT images obtained throughout the head without intravenous contrast. Sagittal and coronal reconstructions were performed.    COMPARISON:  None.    FINDINGS:  Artifacts related to beam hardening and/or motion degrade portions of the scan.    Intracranial compartment:    Ventricles and sulci are normal in size for age without evidence of hydrocephalus. No extra-axial blood or fluid collections.    The brain parenchyma appears normal. No parenchymal mass, hemorrhage, edema or major vascular distribution infarct.    Skull/extracranial contents (limited evaluation): No depressed  calvarial fracture.    Minimal left preseptal periorbital soft tissue swelling.    Otherwise, the visualized portions of the orbits, mastoid air cells, and paranasal sinuses demonstrate no acute CT abnormalities                                       Medications   sodium chloride 0.9% bolus 500 mL 500 mL (0 mLs Intravenous Stopped 1/13/25 2245)   Tdap vaccine injection 0.5 mL (0.5 mLs Intramuscular Given 1/13/25 2311)   LETS (LIDOcaine-TETRAcaine-EPINEPHrine) gel solution ( Topical (Top) Given 1/13/25 9301)     Medical Decision Making  59 y.o. male with no significant past medical history presents for fall.   Differentials include vasovagal syncope, orthostatic hypotension, electrolyte derangement, laceration, TBI, concussion, PE, ACS  Patient presenting after lost consciousness after getting up out of seated position, has had 4 alkaline measures.  No clinical intoxication, GCS 15, no focal neurologic deficits.  Has a small laceration to the left eyelid not involving the musculature, extraocular movements intact, no evidence of ocular involvement, no vision changes  No other evidence of MSK injury on exam.  Patient does report brief episode of chest pain after the fall.  No preceding chest pain or shortness of breath, he was tachycardic on arrival which resolved without intervention.  I did consider PE though thought less likely, patient overall low risk.  D-dimer ordered to evaluate further  Cardiac workup unremarkable, no intracranial injuries, no further episodes of dizziness or chest pain, lower risk for ACS. Discharged with PCP follow up, wound care instructions, return precautions  EKG without evidence of acute ischemia or arrhythmia, has shows chronic nonspecific ST abnormality    Amount and/or Complexity of Data Reviewed  External Data Reviewed: labs and notes.  Labs:  Decision-making details documented in ED Course.  Radiology:  Decision-making details documented in ED Course.  ECG/medicine tests:   Decision-making details documented in ED Course.    Risk  Prescription drug management.      Additional MDM:   Heart Score:    History:          Slightly suspicious.  ECG:             Nonspecific repolarisation disturbance  Age:               45-65 years  Risk factors: 1-2 risk factors  Troponin:       Less than or equal to normal limit  Heart Score = 3                ED Course as of 01/14/25 1818 Mon Jan 13, 2025 2140 Comprehensive metabolic panel(!)  Chronic, unchanged transaminitis.  Creatinine slightly above baseline not meeting criteria for JUAN [OK]   2142 EKG 12-lead  Findings, per independent interpretation:  Sinus rhythm, regular, narrow complex, rate of 95, no STEMI, nonspecific ST abnormality in leads 3 and AVF, no significant change compared to prior [OK]   2151 Hemoglobin(!): 8.5  Decreased compared to prior baseline [OK]   2151 WBC: 8.80  No leukocytosis [OK]   2151 Troponin I High Sensitivity: <3 [OK]   2338 X-Ray Chest AP Portable  Findings, per independent interpretation:  Symmetrically expanded lungs, no focal consolidation, no pulmonary edema, no pneumothorax   [OK]   2350 CT Head Without Contrast  No acute findings [OK]      ED Course User Index  [OK] Zeke Pena MD                             Clinical Impression:  Final diagnoses:  [R07.9] Chest pain  [R40.20] LOC (loss of consciousness) (Primary)  [S09.90XA] Closed head injury, initial encounter  [S01.112A] Eyebrow laceration, left, initial encounter          ED Disposition Condition    Discharge Stable          ED Prescriptions    None       Follow-up Information    None          Zeke Pena MD  01/14/25 1819

## 2025-01-14 NOTE — DISCHARGE INSTRUCTIONS
Keep your wound completely dry and clean for the first 24 hours  After 24 hours, you can gently rinse the wound with water and soap  Do not submerge your wound in water until after removal of your sutures  Schedule a visit with your primary care doctor for re-evaluation and for suture removal in 5-7 days  Return to the emergency department if you have any consistent bleeding, opening of the wound, pus draining from the wound, or for any other concerning symptoms  To minimize scarring avoid getting sunlight on the wound, if you are in the sun make sure to use sunscreen over the wound (only after first 24 hours). After suture removal, you can gently massage the wound which can also help minimize scarring

## 2025-01-15 ENCOUNTER — TELEPHONE (OUTPATIENT)
Dept: ADMINISTRATIVE | Facility: CLINIC | Age: 60
End: 2025-01-15
Payer: COMMERCIAL

## 2025-01-15 NOTE — TELEPHONE ENCOUNTER
Patient visited the ED on 1/13/2025.  Patient outreached on two separate post ED text escalation attempts, but unable to reach.  Encounter closed at this time.

## 2025-04-21 PROBLEM — Z00.00 HEALTHCARE MAINTENANCE: Status: ACTIVE | Noted: 2025-04-21

## 2025-04-21 PROBLEM — R74.8 ABNORMAL LIVER ENZYMES: Status: ACTIVE | Noted: 2025-04-21

## 2025-04-21 PROBLEM — N18.31 STAGE 3A CHRONIC KIDNEY DISEASE: Status: ACTIVE | Noted: 2025-04-21

## 2025-04-21 PROBLEM — E66.811 CLASS 1 OBESITY DUE TO EXCESS CALORIES WITH SERIOUS COMORBIDITY AND BODY MASS INDEX (BMI) OF 33.0 TO 33.9 IN ADULT: Status: ACTIVE | Noted: 2025-04-21

## 2025-04-21 PROBLEM — E66.09 CLASS 1 OBESITY DUE TO EXCESS CALORIES WITH SERIOUS COMORBIDITY AND BODY MASS INDEX (BMI) OF 33.0 TO 33.9 IN ADULT: Status: ACTIVE | Noted: 2025-04-21

## 2025-04-21 PROBLEM — Z12.11 SCREENING FOR COLON CANCER: Status: ACTIVE | Noted: 2025-04-21

## 2025-04-21 PROBLEM — D64.9 ANEMIA: Status: ACTIVE | Noted: 2025-04-21

## 2025-06-16 ENCOUNTER — TELEPHONE (OUTPATIENT)
Dept: PRIMARY CARE CLINIC | Facility: CLINIC | Age: 60
End: 2025-06-16
Payer: COMMERCIAL

## 2025-06-16 NOTE — TELEPHONE ENCOUNTER
Copied from CRM #[id number]. Topic: Appointments - Appointment Access  >> Jun 16, 2025  9:41 AM Thais wrote:  Type:  Appointment Request         Name of Caller:pt  When is the first available appointment?No access  Symptoms:est care  Would the patient rather a call back or a response via [facility]? call  Best Call Back Number:[phone]   Additional Information: Pt was scheduled on 04/25 and would like to reschedule before he goes out of country in 3 weeks.

## 2025-06-23 NOTE — ASSESSMENT & PLAN NOTE
FIB-4 Calculation: 2.96 at 1/13/2025  9:06 PM  Calculated from:  SGOT/AST: 111 U/L at 1/13/2025  9:06 PM  SGPT/ALT: 61 U/L at 1/13/2025  9:06 PM  Platelets: 293 K/uL at 1/13/2025  9:06 PM  Age: 59 years   --high risk Fib4 score, even for his age    Lab Results   Component Value Date    ALT 61 (H) 01/13/2025     (H) 01/13/2025    ALKPHOS 92 01/13/2025    BILITOT 0.4 01/13/2025    ALBUMIN 3.5 01/13/2025     01/13/2025      Lab Results   Component Value Date    HEPCAB Non-reactive 01/13/2025   --history of elevated transaminases, most c/w hepatocellular pattern of injury (without prior evaluation)  --could be due to MASLD/MASH, but need to exclude other potential causes of liver injury such as autoimmune hepatitis, hemachromatosis, alcohol, and viral hepatitis

## 2025-06-23 NOTE — PROGRESS NOTES
History of Present Illness    CHIEF COMPLAINT:  Martín presents today with concerns about elevated heart rate and vasodepressor syncope symptoms.    VASODEPRESSOR SYNCOPE AND CARDIAC SYMPTOMS:  He reports recurrence of vasodepressor syncope symptoms over the past 6 months, previously confirmed by tilt table test 10 years ago. He experiences dizziness upon standing requiring brief pauses before moving. His Apple Watch shows elevated resting heart rate in the 90s-100s, increased from baseline of high 60s to low 70s. He also reports palpitations associated with getting winded.    BLOOD PRESSURE:  He reports daily blood pressure monitoring with readings consistently around 124/73. He takes hydrochlorothiazide 12.5 mg and losartan 100 mg daily for management, though missed this morning's dose due to early office visit. He denies blood pressure fluctuation symptoms.    GENITOURINARY:  He reports increased nighttime urinary frequency. He denies persistent urinary bubbles or other significant urinary symptoms. He is aware of previously elevated creatinine.    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 History of hemorrhoids, currently resolved.    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    LABS:  Hemoglobin is 8.5 with RBC count 3.5-3.8, indicating anemia. Creatinine is elevated. Liver enzymes are abnormal with normal bilirubin, potentially influenced by recent alcohol consumption at a bachelor party.      ROS:  General: no fever, no chills, no fatigue, no weight gain, no weight loss  Eyes: no vision changes, no redness, no discharge  ENT: no ear pain, no nasal congestion, no sore throat  Cardiovascular: no chest pain, reports palpitations, no lower extremity edema, reports orthostatic symptoms, reports nearno syncope, reports  "syncope, reports feelings of fast heart rate  Respiratory: no cough, reports shortness of breath  Gastrointestinal: no abdominal pain, no nausea, no vomiting, no diarrhea, no constipation, no blood in stool  Genitourinary: no dysuria, no hematuria, no frequency, reports nocturia  Musculoskeletal: no joint pain, no muscle pain  Skin: no rash, no lesion  Neurological: no headache, no dizziness, no numbness, no tingling  Psychiatric: no anxiety, no depression, no sleep difficulty         UACR No results found for: "CRTURNMGSPEC", "UMICROALBABS", "MICALBCREAT"   Blood Pressure (Goal < 130/80) BP Readings from Last 3 Encounters:   06/27/25 124/74   01/14/25 121/82   11/27/23 (!) 150/100      Renal Function Panel BMP  Lab Results   Component Value Date     06/27/2025    K 3.9 06/27/2025     06/27/2025    CO2 20 (L) 06/27/2025    BUN 9 06/27/2025    CREATININE 0.9 06/27/2025    CALCIUM 8.4 (L) 06/27/2025    ANIONGAP 13 06/27/2025    EGFRNORACEVR >60 06/27/2025      DM Lab Results   Component Value Date    HGBA1C 5.4 06/27/2025      Lipid Management Triglyceride (mg/dL)   Date Value   06/27/2025 56     HDL Cholesterol (mg/dL)   Date Value   06/27/2025 60     LDL Cholesterol (mg/dL)   Date Value   06/27/2025 54.8 (L)      KFRE2 & KFRE5 Computed KFRE 2-Year unavailable. One or more values for this score either were not found within the given timeframe or did not fit some other criterion.    Computed KFRE 5-Year unavailable. One or more values for this score either were not found within the given timeframe or did not fit some other criterion.     CKD stage G3a/A?  eGFR 45 mL/min     Atherosclerotic Cardiovascular Disease (ASCVD) Risk Score  The ASCVD Risk score (Ortiz DUMONT, et al., 2019) failed to calculate for the following reasons:    The valid total cholesterol range is 130 to 320 mg/dL    Past Medical History:  History reviewed. No pertinent past medical history.    Past Surgical History:  Past Surgical History: "   Procedure Laterality Date    KNEE ARTHROPLASTY Right 09/1998       Allergies:  Review of patient's allergies indicates:  No Known Allergies    Family History:  No family history on file.      Mental Health Screening  PHQ-9  Depression Patient Health Questionnaire (PHQ-9)  Over the last two weeks how often have you been bothered by little interest or pleasure in doing things: Not at all  Over the last two weeks how often have you been bothered by feeling down, depressed or hopeless: Not at all    PHYSICAL EXAM   Vital Signs  /74   Pulse 106   Ht 6' (1.829 m)   Wt 124.8 kg (275 lb 2.2 oz)   BMI 37.31 kg/m²     Physical Exam    Vitals: Heart rate: 106.  General: Well-developed. Well-nourished. No acute distress.  Eyes: EOMI. Sclerae anicteric. Pale conjunctiva.  HENT: Normocephalic. Atraumatic. Nares patent. Moist oral mucosa.  Cardiovascular: Regular rate. Regular rhythm. No murmurs. No rubs. No gallops. Normal S1, S2.  Respiratory: Normal respiratory effort. Clear to auscultation bilaterally. No rales. No rhonchi. No wheezing.  Abdomen: Umbilical hernia.  Musculoskeletal: No  obvious deformity.  Extremities: No lower extremity edema.  Neurological: Alert & oriented x3. No slurred speech. Normal gait.  Psychiatric: Normal mood. Normal affect. Good insight. Good judgment.  Skin: Warm. Dry. No rash.  Lymph nodes:  No adenopathy      Lab Results   Component Value Date    WBC 8.07 06/27/2025    HGB 5.6 (LL) 06/27/2025    HCT 21.1 (L) 06/27/2025     (H) 06/27/2025    CHOL 126 06/27/2025    TRIG 56 06/27/2025    HDL 60 06/27/2025    ALT 61 (H) 01/13/2025     (H) 01/13/2025     06/27/2025    K 3.9 06/27/2025     06/27/2025    CREATININE 0.9 06/27/2025    BUN 9 06/27/2025    CO2 20 (L) 06/27/2025    PSA 11.44 (H) 06/27/2025    HGBA1C 5.4 06/27/2025     ASSESSMENT/PLAN     Martín Thomason is a 59 y.o. male with    1. Stage 3a chronic kidney disease  Assessment & Plan:  BMP  Lab Results  "  Component Value Date     01/13/2025    K 3.8 01/13/2025     01/13/2025    CO2 19 (L) 01/13/2025    BUN 12 01/13/2025    CREATININE 1.7 (H) 01/13/2025    CALCIUM 9.0 01/13/2025    ANIONGAP 14 01/13/2025    EGFRNORACEVR 45.3 (A) 01/13/2025   **needs to be further investigated  **need UACR/UPCR, iron studies, evaluation for CKD-MBD    BMP  Lab Results   Component Value Date     06/27/2025    K 3.9 06/27/2025     06/27/2025    CO2 20 (L) 06/27/2025    BUN 9 06/27/2025    CREATININE 0.9 06/27/2025    CALCIUM 8.4 (L) 06/27/2025    ANIONGAP 13 06/27/2025    EGFRNORACEVR >60 06/27/2025   --he had 2 prior serum creatinines that were 1.5 and 1.7 (one year and 5 months ago), but his newest sCr is much better rasiing doubt about whether he actually has CKD (and his anemia seems to be due to severe iron deficiency and possible occult GI blood loss)    Orders:  -     Microalbumin/Creatinine Ratio, Urine; Future; Expected date: 06/30/2025  -     Lipid Panel; Future; Expected date: 06/30/2025  -     Renal Function Panel; Future; Expected date: 06/30/2025  -     Vitamin D; Future; Expected date: 06/30/2025  -     PTH, Intact; Future; Expected date: 06/30/2025  -     CBC Auto Differential; Future; Expected date: 06/30/2025  -     Iron and TIBC; Future; Expected date: 06/30/2025  -     Ferritin; Future; Expected date: 06/30/2025  -     Protein/Creatinine Ratio, Urine    2. Anemia, unspecified type  Assessment & Plan:  Lab Results   Component Value Date    WBC 8.80 01/13/2025    HGB 8.5 (L) 01/13/2025    HCT 27.8 (L) 01/13/2025    MCV 91 01/13/2025     01/13/2025   No results found for: "UIBC", "IRON", "TRANS", "TRANSFERRIN", "TIBC", "LABIRON", "FESATURATED"   No results found for: "FERRITIN"  **needs further evaluation >> could be anemia of CKD, or iron def anemia, or other >> will investigate  **check iron/TIBC, ferritin, reticulocytes, and repeat CBC  (He specifically denies melena or hematochezia, " but still may require further GI evaluation)    Lab Results   Component Value Date    WBC 8.07 06/27/2025    HGB 5.6 (LL) 06/27/2025    HCT 21.1 (L) 06/27/2025    MCV 68 (L) 06/27/2025     (H) 06/27/2025     Lab Results   Component Value Date    IRON 11 (L) 06/27/2025    TRANSFERRIN 329 06/27/2025    TIBC 487 (H) 06/27/2025    LABIRON 2 (L) 06/27/2025     Lab Results   Component Value Date    FERRITIN 6.0 (L) 06/27/2025   --severe anemia c/w iron deficiency  **he is symptomatic and therefore would benefit from admission, transfusion, and GI eval for source of GI blood loss  **connected with him by phone and advised him to go to ER for transfusion of PRBCs and further evaluation    Orders:  -     CBC Auto Differential; Future; Expected date: 06/30/2025  -     Iron and TIBC; Future; Expected date: 06/30/2025  -     Ferritin; Future; Expected date: 06/30/2025  -     RETICULOCYTES; Future; Expected date: 06/30/2025    3. Abnormal liver enzymes  Assessment & Plan:  --history of elevated transaminases, most c/w hepatocellular pattern of injury (without prior evaluation)  --could be due to MASLD/MASH, but need to exclude other potential causes of liver injury such as autoimmune hepatitis, hemachromatosis, alcohol, and viral hepatitis    Lab Results   Component Value Date    ALT 61 (H) 01/13/2025     (H) 01/13/2025    ALKPHOS 92 01/13/2025    BILITOT 0.4 01/13/2025    ALBUMIN 3.5 01/13/2025     01/13/2025   **needs further evaluation    Orders:  -     Anti-Smooth Muscle Antibody; Future; Expected date: 06/30/2025  -     IgG; Future; Expected date: 06/30/2025  -     DILAN Screen w/Reflex; Future; Expected date: 06/30/2025  -     Phosphatidylethanol (PETH); Future; Expected date: 06/30/2025  -     FibroScan (Vibration Controlled Transient Elastography); Future; Expected date: 06/30/2025    4. Orthostasis  Assessment & Plan:  He is describing symptoms of light headedness upon standing  --he had been  diagnosed with vasopressor syncope in the past via tilt table testing  --his BP's at home have been mostly 120's/70's (he checks them every day)  --today his BP is elevated, but he reports that he hasn't taken his BP meds yet today  --he also complains of some exertional dyspnea when his HR gets elevated      5. Class 2 severe obesity due to excess calories with serious comorbidity and body mass index (BMI) of 37.0 to 37.9 in adult  Assessment & Plan:  FIB-4 Calculation: 2.96 at 1/13/2025  9:06 PM  Calculated from:  SGOT/AST: 111 U/L at 1/13/2025  9:06 PM  SGPT/ALT: 61 U/L at 1/13/2025  9:06 PM  Platelets: 293 K/uL at 1/13/2025  9:06 PM  Age: 59 years   --high risk Fib4 score, even for his age    Lab Results   Component Value Date    ALT 61 (H) 01/13/2025     (H) 01/13/2025    ALKPHOS 92 01/13/2025    BILITOT 0.4 01/13/2025    ALBUMIN 3.5 01/13/2025     01/13/2025      Lab Results   Component Value Date    HEPCAB Non-reactive 01/13/2025   --history of elevated transaminases, most c/w hepatocellular pattern of injury (without prior evaluation)  --could be due to MASLD/MASH, but need to exclude other potential causes of liver injury such as autoimmune hepatitis, hemachromatosis, alcohol, and viral hepatitis    Orders:  -     Hemoglobin A1C; Future; Expected date: 06/27/2025    6. Screening for colon cancer  Assessment & Plan:  He reports his last colonoscopy was ~10 years ago  --had no polyps, and is requesting to be re-screened at this point    Orders:  -     Ambulatory referral/consult to Endo Procedure ; Future; Expected date: 06/28/2025    7. Healthcare maintenance  Assessment & Plan:  --he is requesting a PSA    Prostate Specific Antigen (ng/mL)   Date Value   06/27/2025 11.44 (H)   --PSA is elevated >> will require further evaluation and urology referral, but first need to address his severe anemia    Orders:  -     PSA, Screening; Future; Expected date: 06/27/2025      Follow up in about  2 weeks (around 7/11/2025).    Primo Culver MD/[facility]  Internal Medicine  [facility]

## 2025-06-23 NOTE — ASSESSMENT & PLAN NOTE
"Lab Results   Component Value Date    WBC 8.80 01/13/2025    HGB 8.5 (L) 01/13/2025    HCT 27.8 (L) 01/13/2025    MCV 91 01/13/2025     01/13/2025   No results found for: "UIBC", "IRON", "TRANS", "TRANSFERRIN", "TIBC", "LABIRON", "FESATURATED"   No results found for: "FERRITIN"  **needs further evaluation >> could be anemia of CKD, or iron def anemia, or other >> will investigate  **check iron/TIBC, ferritin, reticulocytes, and repeat CBC  (He specifically denies melena or hematochezia, but still may require further GI evaluation)    Lab Results   Component Value Date    WBC 8.07 06/27/2025    HGB 5.6 (LL) 06/27/2025    HCT 21.1 (L) 06/27/2025    MCV 68 (L) 06/27/2025     (H) 06/27/2025     Lab Results   Component Value Date    IRON 11 (L) 06/27/2025    TRANSFERRIN 329 06/27/2025    TIBC 487 (H) 06/27/2025    LABIRON 2 (L) 06/27/2025     Lab Results   Component Value Date    FERRITIN 6.0 (L) 06/27/2025   --severe anemia c/w iron deficiency  **he is symptomatic and therefore would benefit from admission, transfusion, and GI eval for source of GI blood loss  **connected with him by phone and advised him to go to ER for transfusion of PRBCs and further evaluation  "

## 2025-06-23 NOTE — ASSESSMENT & PLAN NOTE
--history of elevated transaminases, most c/w hepatocellular pattern of injury (without prior evaluation)  --could be due to MASLD/MASH, but need to exclude other potential causes of liver injury such as autoimmune hepatitis, hemachromatosis, alcohol, and viral hepatitis    Lab Results   Component Value Date    ALT 61 (H) 01/13/2025     (H) 01/13/2025    ALKPHOS 92 01/13/2025    BILITOT 0.4 01/13/2025    ALBUMIN 3.5 01/13/2025     01/13/2025   **needs further evaluation

## 2025-06-27 ENCOUNTER — OFFICE VISIT (OUTPATIENT)
Dept: PRIMARY CARE CLINIC | Facility: CLINIC | Age: 60
End: 2025-06-27
Attending: INTERNAL MEDICINE
Payer: COMMERCIAL

## 2025-06-27 ENCOUNTER — TELEPHONE (OUTPATIENT)
Dept: ENDOSCOPY | Facility: HOSPITAL | Age: 60
End: 2025-06-27
Payer: COMMERCIAL

## 2025-06-27 ENCOUNTER — PATIENT MESSAGE (OUTPATIENT)
Dept: PRIMARY CARE CLINIC | Facility: CLINIC | Age: 60
End: 2025-06-27

## 2025-06-27 ENCOUNTER — LAB VISIT (OUTPATIENT)
Dept: LAB | Facility: HOSPITAL | Age: 60
End: 2025-06-27
Attending: INTERNAL MEDICINE
Payer: COMMERCIAL

## 2025-06-27 VITALS
HEIGHT: 72 IN | HEART RATE: 106 BPM | DIASTOLIC BLOOD PRESSURE: 74 MMHG | SYSTOLIC BLOOD PRESSURE: 124 MMHG | WEIGHT: 275.13 LBS | BODY MASS INDEX: 37.27 KG/M2

## 2025-06-27 DIAGNOSIS — N18.31 STAGE 3A CHRONIC KIDNEY DISEASE: Primary | ICD-10-CM

## 2025-06-27 DIAGNOSIS — R74.8 ABNORMAL LIVER ENZYMES: ICD-10-CM

## 2025-06-27 DIAGNOSIS — D64.9 ANEMIA, UNSPECIFIED TYPE: ICD-10-CM

## 2025-06-27 DIAGNOSIS — E66.812 CLASS 2 SEVERE OBESITY DUE TO EXCESS CALORIES WITH SERIOUS COMORBIDITY AND BODY MASS INDEX (BMI) OF 37.0 TO 37.9 IN ADULT: ICD-10-CM

## 2025-06-27 DIAGNOSIS — Z00.00 HEALTHCARE MAINTENANCE: ICD-10-CM

## 2025-06-27 DIAGNOSIS — E66.01 CLASS 2 SEVERE OBESITY DUE TO EXCESS CALORIES WITH SERIOUS COMORBIDITY AND BODY MASS INDEX (BMI) OF 37.0 TO 37.9 IN ADULT: ICD-10-CM

## 2025-06-27 DIAGNOSIS — I95.1 ORTHOSTASIS: ICD-10-CM

## 2025-06-27 DIAGNOSIS — Z12.11 SCREENING FOR COLON CANCER: ICD-10-CM

## 2025-06-27 DIAGNOSIS — N18.31 STAGE 3A CHRONIC KIDNEY DISEASE: ICD-10-CM

## 2025-06-27 LAB
25(OH)D3+25(OH)D2 SERPL-MCNC: 42 NG/ML (ref 30–96)
ABSOLUTE EOSINOPHIL (OHS): 0.11 K/UL
ABSOLUTE MONOCYTE (OHS): 0.66 K/UL (ref 0.3–1)
ABSOLUTE NEUTROPHIL COUNT (OHS): 4.64 K/UL (ref 1.8–7.7)
ALBUMIN SERPL BCP-MCNC: 3.4 G/DL (ref 3.5–5.2)
ALBUMIN/CREAT UR: 19.3 UG/MG
ANION GAP (OHS): 13 MMOL/L (ref 8–16)
ANISOCYTOSIS BLD QL SMEAR: SLIGHT
BASOPHILS # BLD AUTO: 0.1 K/UL
BASOPHILS NFR BLD AUTO: 1.2 %
BUN SERPL-MCNC: 9 MG/DL (ref 8–23)
CALCIUM SERPL-MCNC: 8.4 MG/DL (ref 8.7–10.5)
CHLORIDE SERPL-SCNC: 103 MMOL/L (ref 95–110)
CHOLEST SERPL-MCNC: 126 MG/DL (ref 120–199)
CHOLEST/HDLC SERPL: 2.1 {RATIO} (ref 2–5)
CO2 SERPL-SCNC: 20 MMOL/L (ref 23–29)
CREAT SERPL-MCNC: 0.9 MG/DL (ref 0.5–1.4)
CREAT UR-MCNC: 161 MG/DL (ref 23–375)
CREAT UR-MCNC: 166 MG/DL (ref 23–375)
EAG (OHS): 108 MG/DL (ref 68–131)
ERYTHROCYTE [DISTWIDTH] IN BLOOD BY AUTOMATED COUNT: 21.2 % (ref 11.5–14.5)
FERRITIN SERPL-MCNC: 6 NG/ML (ref 20–300)
GFR SERPLBLD CREATININE-BSD FMLA CKD-EPI: >60 ML/MIN/1.73/M2
GLUCOSE SERPL-MCNC: 126 MG/DL (ref 70–110)
HBA1C MFR BLD: 5.4 % (ref 4–5.6)
HCT VFR BLD AUTO: 21.1 % (ref 40–54)
HDLC SERPL-MCNC: 60 MG/DL (ref 40–75)
HDLC SERPL: 47.6 % (ref 20–50)
HGB BLD-MCNC: 5.6 GM/DL (ref 14–18)
HYPOCHROMIA BLD QL SMEAR: NORMAL
IGG SERPL-MCNC: 2113 MG/DL (ref 650–1600)
IMM GRANULOCYTES # BLD AUTO: 0.04 K/UL (ref 0–0.04)
IMM GRANULOCYTES NFR BLD AUTO: 0.5 % (ref 0–0.5)
IRON SATN MFR SERPL: 2 % (ref 20–50)
IRON SERPL-MCNC: 11 UG/DL (ref 45–160)
LDLC SERPL CALC-MCNC: 54.8 MG/DL (ref 63–159)
LYMPHOCYTES # BLD AUTO: 2.52 K/UL (ref 1–4.8)
MCH RBC QN AUTO: 17.9 PG (ref 27–31)
MCHC RBC AUTO-ENTMCNC: 26.5 G/DL (ref 32–36)
MCV RBC AUTO: 68 FL (ref 82–98)
MICROALBUMIN UR-MCNC: 31 UG/ML (ref ?–5000)
NONHDLC SERPL-MCNC: 66 MG/DL
NUCLEATED RBC (/100WBC) (OHS): 1 /100 WBC
PHOSPHATE SERPL-MCNC: 2.8 MG/DL (ref 2.7–4.5)
PLATELET # BLD AUTO: 483 K/UL (ref 150–450)
PMV BLD AUTO: 9.1 FL (ref 9.2–12.9)
POLYCHROMASIA BLD QL SMEAR: NORMAL
POTASSIUM SERPL-SCNC: 3.9 MMOL/L (ref 3.5–5.1)
PROT UR-MCNC: 17 MG/DL
PROT/CREAT UR: 0.1 MG/G{CREAT}
PSA SERPL-MCNC: 11.44 NG/ML
PTH-INTACT SERPL-MCNC: 82.4 PG/ML (ref 9–77)
RBC # BLD AUTO: 3.12 M/UL (ref 4.6–6.2)
RELATIVE EOSINOPHIL (OHS): 1.4 %
RELATIVE LYMPHOCYTE (OHS): 31.2 % (ref 18–48)
RELATIVE MONOCYTE (OHS): 8.2 % (ref 4–15)
RELATIVE NEUTROPHIL (OHS): 57.5 % (ref 38–73)
RETICS/RBC NFR AUTO: 2.1 % (ref 0.4–2)
SODIUM SERPL-SCNC: 136 MMOL/L (ref 136–145)
TIBC SERPL-MCNC: 487 UG/DL (ref 250–450)
TRANSFERRIN SERPL-MCNC: 329 MG/DL (ref 200–375)
TRIGL SERPL-MCNC: 56 MG/DL (ref 30–150)
WBC # BLD AUTO: 8.07 K/UL (ref 3.9–12.7)

## 2025-06-27 PROCEDURE — 82728 ASSAY OF FERRITIN: CPT

## 2025-06-27 PROCEDURE — 80321 ALCOHOLS BIOMARKERS 1OR 2: CPT

## 2025-06-27 PROCEDURE — 86015 ACTIN ANTIBODY EACH: CPT

## 2025-06-27 PROCEDURE — 85025 COMPLETE CBC W/AUTO DIFF WBC: CPT

## 2025-06-27 PROCEDURE — 82043 UR ALBUMIN QUANTITATIVE: CPT

## 2025-06-27 PROCEDURE — 82784 ASSAY IGA/IGD/IGG/IGM EACH: CPT

## 2025-06-27 PROCEDURE — 84466 ASSAY OF TRANSFERRIN: CPT

## 2025-06-27 PROCEDURE — 82306 VITAMIN D 25 HYDROXY: CPT

## 2025-06-27 PROCEDURE — 36415 COLL VENOUS BLD VENIPUNCTURE: CPT

## 2025-06-27 PROCEDURE — 85045 AUTOMATED RETICULOCYTE COUNT: CPT

## 2025-06-27 PROCEDURE — 84295 ASSAY OF SERUM SODIUM: CPT

## 2025-06-27 PROCEDURE — 83970 ASSAY OF PARATHORMONE: CPT

## 2025-06-27 PROCEDURE — 86038 ANTINUCLEAR ANTIBODIES: CPT

## 2025-06-27 PROCEDURE — 83036 HEMOGLOBIN GLYCOSYLATED A1C: CPT

## 2025-06-27 PROCEDURE — 84153 ASSAY OF PSA TOTAL: CPT

## 2025-06-27 PROCEDURE — 82465 ASSAY BLD/SERUM CHOLESTEROL: CPT

## 2025-06-27 RX ORDER — LOSARTAN POTASSIUM 100 MG/1
100 TABLET ORAL DAILY
COMMUNITY

## 2025-06-27 NOTE — ASSESSMENT & PLAN NOTE
He reports his last colonoscopy was ~10 years ago  --had no polyps, and is requesting to be re-screened at this point

## 2025-06-27 NOTE — ASSESSMENT & PLAN NOTE
BMP  Lab Results   Component Value Date     01/13/2025    K 3.8 01/13/2025     01/13/2025    CO2 19 (L) 01/13/2025    BUN 12 01/13/2025    CREATININE 1.7 (H) 01/13/2025    CALCIUM 9.0 01/13/2025    ANIONGAP 14 01/13/2025    EGFRNORACEVR 45.3 (A) 01/13/2025   **needs to be further investigated  **need UACR/UPCR, iron studies, evaluation for CKD-MBD    BMP  Lab Results   Component Value Date     06/27/2025    K 3.9 06/27/2025     06/27/2025    CO2 20 (L) 06/27/2025    BUN 9 06/27/2025    CREATININE 0.9 06/27/2025    CALCIUM 8.4 (L) 06/27/2025    ANIONGAP 13 06/27/2025    EGFRNORACEVR >60 06/27/2025   --he had 2 prior serum creatinines that were 1.5 and 1.7 (one year and 5 months ago), but his newest sCr is much better rasiing doubt about whether he actually has CKD (and his anemia seems to be due to severe iron deficiency and possible occult GI blood loss)

## 2025-06-27 NOTE — ASSESSMENT & PLAN NOTE
--he is requesting a PSA    Prostate Specific Antigen (ng/mL)   Date Value   06/27/2025 11.44 (H)   --PSA is elevated >> will require further evaluation and urology referral, but first need to address his severe anemia

## 2025-06-27 NOTE — ASSESSMENT & PLAN NOTE
He is describing symptoms of light headedness upon standing  --he had been diagnosed with vasopressor syncope in the past via tilt table testing  --his BP's at home have been mostly 120's/70's (he checks them every day)  --today his BP is elevated, but he reports that he hasn't taken his BP meds yet today  --he also complains of some exertional dyspnea when his HR gets elevated

## 2025-06-30 ENCOUNTER — CLINICAL SUPPORT (OUTPATIENT)
Dept: ENDOSCOPY | Facility: HOSPITAL | Age: 60
End: 2025-06-30
Attending: INTERNAL MEDICINE
Payer: COMMERCIAL

## 2025-06-30 ENCOUNTER — TELEPHONE (OUTPATIENT)
Dept: ENDOSCOPY | Facility: HOSPITAL | Age: 60
End: 2025-06-30

## 2025-06-30 VITALS — HEIGHT: 72 IN | BODY MASS INDEX: 37.25 KG/M2 | WEIGHT: 275 LBS

## 2025-06-30 DIAGNOSIS — Z12.11 ENCOUNTER FOR SCREENING COLONOSCOPY: Primary | ICD-10-CM

## 2025-06-30 DIAGNOSIS — Z12.11 SCREENING FOR COLON CANCER: ICD-10-CM

## 2025-06-30 LAB — ANA (OHS): NORMAL

## 2025-06-30 RX ORDER — POLYETHYLENE GLYCOL 3350, SODIUM SULFATE ANHYDROUS, SODIUM BICARBONATE, SODIUM CHLORIDE, POTASSIUM CHLORIDE 236; 22.74; 6.74; 5.86; 2.97 G/4L; G/4L; G/4L; G/4L; G/4L
4 POWDER, FOR SOLUTION ORAL ONCE
Qty: 4000 ML | Refills: 0 | Status: SHIPPED | OUTPATIENT
Start: 2025-06-30 | End: 2025-06-30

## 2025-06-30 NOTE — PATIENT INSTRUCTIONS
.    Colonoscopy Procedure Prep Instructions    Date of procedure: 7/3/25 Arrive at: 6:30 AM    Location of Department:   [facility] [address] [place] [address]  Take the Elevators to 2nd Floor Endoscopy Procedural Area    As soon as possible:   your prep from pharmacy and over the counter DULCOLAX LAXATIVE TABLETS            On the day before your procedure   What You CAN do:   You may have clear liquids ONLY-see below for list.     Liquids That Are OK to Drink:   Water  Sports drinks (Gatorade, Power-Aid)  Coffee or tea (no cream or nondairy creamer)  Clear juices without pulp (apple, white grape)  Gelatin desserts (no fruit or toppings)  Clear soda (sprite, coke, ginger ale)  Chicken broth (until 12 midnight the night before procedure)    What You CANNOT do:   Do not EAT solid food, drink milk or anything   colored red.  Do not drink alcohol.  Do not take oral medications within 1 hour of starting   each dose of prep.  No gum chewing or candy morning of procedure                       Note:   (Please disregard the insert instructions from pharmacy).  PEG Bowel Prep is indicated for cleansing of the colon as a preparation for colonoscopy in adults.   Be sure to tell your doctor about all the medicines you take, including prescription and non-prescription medicines, vitamins, and herbal supplements. PEG Bowel Prep may affect how other medicines work.  Medication taken by mouth may not be absorbed properly when taken within 1 hour before the start of each dose of PEG Bowel Prep.    It is not uncommon to experience some abdominal cramping, nausea and/or vomiting when taking the prep. If you have nausea and/or vomiting while taking the prep, stop drinking for 20 to 30 minutes then continue.      How to take prep:    PEG Bowel Prep is a (2-day) prep.    One (1) bottle of prep are required for a complete preparation for colonoscopy. Dilute the solution concentrate as directed  prior to use. You must drink water with each dose of prep, and additional water after each dose.    DOSE 1--Day Before Colonoscopy 7/2/25     Drink at least 6 to 8 glasses of clear liquids from time you wake up until you begin your prep and then continue until bedtime to avoid dehydration.     12:00 pm (NOON) Mix your entire container of prep with lukewarm water and refrigerate. Take four (4) Dulcolax (Bisacodyl) tablets with at least 8 ounces or more of clear liquids.       6:00 pm:    You must complete Steps 1 and 2 below before going to bed:    Step 1-Drink half the liquid in the container within one (1) hour.   Step 2-Refrigerate the remaining half of the liquid for dose 2. See below when to begin this step.                       IMPORTANT: If you experience preparation-related symptoms (for example, nausea, bloating, or cramping), stop, or slow the rate of drinking the additional water until your symptoms decrease.    DOSE 2--Day of the Colonoscopy 7/3/25 at 2-3 AM.    For this dose, repeat Step 1 shown above using the remaining half of the liquid prep.   You may continue drinking water/clear liquids until   4 hours before your colonoscopy or as directed by the scheduling nurse  3:30 AM.      For information about your procedure, two (2) things to view prior to colonoscopy:  Please watch this informational video. It is important to watch this animated consent video prior to your arrival. If you haven't watched the video prior to arriving, you are required to watch it during admission which can causes delays.    Options for viewing:   Using a keyboard:  press and hold the control tab (Ctrl) and left mouse click to follow links.           Colonoscopy Instructional Video                                                                                   OR    Type link address into your web browser's address bar:  [email or web address]      Educational Booklet with pictures:      Colonoscopy  Prep - Liquid      Comments:    .     IMPORTANT INFORMATION TO KNOW BEFORE YOUR PROCEDURE    [facility] 2nd Floor     If your procedure requires the administration of anesthesia, it is necessary for a responsible adult to drive you home. The designated adult is strongly encouraged to remain in the endoscopy area until the patient is discharged. (Medical Transportation, Uber, Lyft, Taxi, etc. may ONLY be used if a responsible adult is present to accompany you home. The responsible adult CANNOT be the  of the service.)     person must be available to return to pick you up within 15 minutes of being notified of discharge.     Please bring a picture ID, insurance card, and copayment.     Take Medications as directed below:        If you begin taking any blood thinning medications, injectable weight loss/diabetes medications (other than insulin) , Adipex (Phentermine) , please contact the endoscopy scheduling department listed below as soon as possible.    If you are diabetic see the attached instruction sheet regarding your medication.     If you take HEART, BLOOD PRESSURE, SEIZURE, PAIN, LUNG (including inhalers/nebulizers), ANTI-REJECTION (transplant patients), or PSYCHIATRIC medications, please take at your regular times with a sip of water or as directed by the scheduling nurse.     Important contact information:    Endoscopy [phone] Hours of operation Monday-Friday 8:00-4:30pm.    Questions about insurance or financial obligations call [phone] or [phone].    If you have questions regarding the prep or need to reschedule, please call [phone]. After hours questions requiring immediate assistance, contact [facility] nurse line at [phone] or [phone].   NOTE:     On occasion, unforeseen circumstances may cause a delay in your procedure start time. We respect your time and appreciate your patience during these  circumstances.      Comments:

## 2025-07-01 LAB — SMOOTH MUSCLE AB TITR SER IF: ABNORMAL {TITER}

## 2025-07-02 LAB
PLPETH BLD-MCNC: 873 NG/ML
POPETH BLD-MCNC: 1060 NG/ML
TOXICOLOGIST REVIEW: NORMAL

## 2025-07-03 ENCOUNTER — TELEPHONE (OUTPATIENT)
Dept: ENDOSCOPY | Facility: HOSPITAL | Age: 60
End: 2025-07-03
Payer: COMMERCIAL

## 2025-07-03 ENCOUNTER — PATIENT MESSAGE (OUTPATIENT)
Dept: ENDOSCOPY | Facility: HOSPITAL | Age: 60
End: 2025-07-03
Payer: COMMERCIAL

## 2025-07-03 DIAGNOSIS — Z12.11 SCREENING FOR COLON CANCER: Primary | ICD-10-CM

## 2025-07-03 NOTE — TELEPHONE ENCOUNTER
Pt scheduled 7/3/25 for colonoscopy. Pt pcp ordered blood transfusion for 7/7/25. Pt re-scheduled to 7/10/25    Patient is scheduled for a Colonoscopy on 7/10/25 with Dr. EDOUARD Miranda  Referral for procedure from PAT [id number]

## 2025-07-08 ENCOUNTER — TELEPHONE (OUTPATIENT)
Dept: ENDOSCOPY | Facility: HOSPITAL | Age: 60
End: 2025-07-08
Payer: COMMERCIAL

## 2025-07-08 ENCOUNTER — HOSPITAL ENCOUNTER (INPATIENT)
Facility: HOSPITAL | Age: 60
LOS: 3 days | Discharge: HOME | DRG: 374 | End: 2025-07-11
Attending: EMERGENCY MEDICINE
Payer: COMMERCIAL

## 2025-07-08 DIAGNOSIS — D64.9 ANEMIA: ICD-10-CM

## 2025-07-08 DIAGNOSIS — K63.89 COLONIC MASS: Primary | ICD-10-CM

## 2025-07-08 DIAGNOSIS — R00.0 TACHYCARDIA: ICD-10-CM

## 2025-07-08 DIAGNOSIS — R07.9 CHEST PAIN: ICD-10-CM

## 2025-07-08 DIAGNOSIS — K76.9 LIVER LESION: ICD-10-CM

## 2025-07-08 PROBLEM — D50.9 IRON DEFICIENCY ANEMIA: Status: ACTIVE | Noted: 2025-07-08

## 2025-07-08 LAB
ABSOLUTE EOSINOPHIL (OHS): 0.17 K/UL
ABSOLUTE MONOCYTE (OHS): 0.52 K/UL (ref 0.3–1)
ABSOLUTE NEUTROPHIL COUNT (OHS): 3.37 K/UL (ref 1.8–7.7)
ALBUMIN SERPL BCP-MCNC: 3.5 G/DL (ref 3.5–5.2)
ALP SERPL-CCNC: 77 UNIT/L (ref 40–150)
ALT SERPL W/O P-5'-P-CCNC: 30 UNIT/L (ref 10–44)
ANION GAP (OHS): 10 MMOL/L (ref 8–16)
ANISOCYTOSIS BLD QL SMEAR: SLIGHT
AST SERPL-CCNC: 49 UNIT/L (ref 11–45)
BASOPHILS # BLD AUTO: 0.08 K/UL
BASOPHILS NFR BLD AUTO: 1.3 %
BILIRUB SERPL-MCNC: 0.6 MG/DL (ref 0.1–1)
BILIRUB UR QL STRIP.AUTO: NEGATIVE
BUN SERPL-MCNC: 11 MG/DL (ref 8–23)
CALCIUM SERPL-MCNC: 8.5 MG/DL (ref 8.7–10.5)
CHLORIDE SERPL-SCNC: 105 MMOL/L (ref 95–110)
CLARITY UR: CLEAR
CO2 SERPL-SCNC: 21 MMOL/L (ref 23–29)
COLOR UR AUTO: YELLOW
CREAT SERPL-MCNC: 0.9 MG/DL (ref 0.5–1.4)
ERYTHROCYTE [DISTWIDTH] IN BLOOD BY AUTOMATED COUNT: 25.5 % (ref 11.5–14.5)
FERRITIN SERPL-MCNC: 12 NG/ML (ref 20–300)
GFR SERPLBLD CREATININE-BSD FMLA CKD-EPI: >60 ML/MIN/1.73/M2
GLUCOSE SERPL-MCNC: 113 MG/DL (ref 70–110)
GLUCOSE UR QL STRIP: NEGATIVE
HCT VFR BLD AUTO: 25.5 % (ref 40–54)
HGB BLD-MCNC: 6.6 GM/DL (ref 14–18)
HGB UR QL STRIP: NEGATIVE
HYPOCHROMIA BLD QL SMEAR: NORMAL
IMM GRANULOCYTES # BLD AUTO: 0.07 K/UL (ref 0–0.04)
IMM GRANULOCYTES NFR BLD AUTO: 1.1 % (ref 0–0.5)
INDIRECT COOMBS: NORMAL
IRON SATN MFR SERPL: 3 % (ref 20–50)
IRON SERPL-MCNC: 13 UG/DL (ref 45–160)
KETONES UR QL STRIP: NEGATIVE
LEUKOCYTE ESTERASE UR QL STRIP: NEGATIVE
LYMPHOCYTES # BLD AUTO: 2.01 K/UL (ref 1–4.8)
MCH RBC QN AUTO: 18.1 PG (ref 27–31)
MCHC RBC AUTO-ENTMCNC: 25.9 G/DL (ref 32–36)
MCV RBC AUTO: 70 FL (ref 82–98)
NITRITE UR QL STRIP: NEGATIVE
NUCLEATED RBC (/100WBC) (OHS): 1 /100 WBC
OVALOCYTES BLD QL SMEAR: NORMAL
PH UR STRIP: 6 [PH]
PLATELET # BLD AUTO: 298 K/UL (ref 150–450)
PLATELET BLD QL SMEAR: NORMAL
PMV BLD AUTO: 9.7 FL (ref 9.2–12.9)
POIKILOCYTOSIS BLD QL SMEAR: SLIGHT
POLYCHROMASIA BLD QL SMEAR: NORMAL
POTASSIUM SERPL-SCNC: 3.8 MMOL/L (ref 3.5–5.1)
PROT SERPL-MCNC: 7.8 GM/DL (ref 6–8.4)
PROT UR QL STRIP: NEGATIVE
RBC # BLD AUTO: 3.64 M/UL (ref 4.6–6.2)
RELATIVE EOSINOPHIL (OHS): 2.7 %
RELATIVE LYMPHOCYTE (OHS): 32.3 % (ref 18–48)
RELATIVE MONOCYTE (OHS): 8.4 % (ref 4–15)
RELATIVE NEUTROPHIL (OHS): 54.2 % (ref 38–73)
RETICS/RBC NFR AUTO: 4.4 % (ref 0.4–2)
RH BLD: NORMAL
SODIUM SERPL-SCNC: 136 MMOL/L (ref 136–145)
SP GR UR STRIP: 1.02
SPECIMEN OUTDATE: NORMAL
TIBC SERPL-MCNC: 471 UG/DL (ref 250–450)
TRANSFERRIN SERPL-MCNC: 318 MG/DL (ref 200–375)
UROBILINOGEN UR STRIP-ACNC: NEGATIVE EU/DL
WBC # BLD AUTO: 6.22 K/UL (ref 3.9–12.7)

## 2025-07-08 PROCEDURE — 83540 ASSAY OF IRON: CPT | Performed by: HOSPITALIST

## 2025-07-08 PROCEDURE — 85025 COMPLETE CBC W/AUTO DIFF WBC: CPT | Performed by: EMERGENCY MEDICINE

## 2025-07-08 PROCEDURE — 80053 COMPREHEN METABOLIC PANEL: CPT | Performed by: EMERGENCY MEDICINE

## 2025-07-08 PROCEDURE — 82728 ASSAY OF FERRITIN: CPT | Performed by: HOSPITALIST

## 2025-07-08 PROCEDURE — 12000002 HC ACUTE/MED SURGE SEMI-PRIVATE ROOM

## 2025-07-08 PROCEDURE — 99285 EMERGENCY DEPT VISIT HI MDM: CPT | Mod: 25

## 2025-07-08 PROCEDURE — 86850 RBC ANTIBODY SCREEN: CPT | Performed by: EMERGENCY MEDICINE

## 2025-07-08 PROCEDURE — 85045 AUTOMATED RETICULOCYTE COUNT: CPT | Performed by: HOSPITALIST

## 2025-07-08 PROCEDURE — 81003 URINALYSIS AUTO W/O SCOPE: CPT | Performed by: PHYSICIAN ASSISTANT

## 2025-07-08 RX ORDER — NALOXONE HCL 0.4 MG/ML
0.02 VIAL (ML) INJECTION
Status: DISCONTINUED | OUTPATIENT
Start: 2025-07-08 | End: 2025-07-11 | Stop reason: HOSPADM

## 2025-07-08 RX ORDER — IBUPROFEN 200 MG
16 TABLET ORAL
Status: DISCONTINUED | OUTPATIENT
Start: 2025-07-08 | End: 2025-07-11 | Stop reason: HOSPADM

## 2025-07-08 RX ORDER — IBUPROFEN 200 MG
24 TABLET ORAL
Status: DISCONTINUED | OUTPATIENT
Start: 2025-07-08 | End: 2025-07-11 | Stop reason: HOSPADM

## 2025-07-08 RX ORDER — GLUCAGON 1 MG
1 KIT INJECTION
Status: DISCONTINUED | OUTPATIENT
Start: 2025-07-08 | End: 2025-07-11 | Stop reason: HOSPADM

## 2025-07-08 RX ORDER — SODIUM CHLORIDE 0.9 % (FLUSH) 0.9 %
10 SYRINGE (ML) INJECTION EVERY 12 HOURS PRN
Status: DISCONTINUED | OUTPATIENT
Start: 2025-07-08 | End: 2025-07-11 | Stop reason: HOSPADM

## 2025-07-08 RX ORDER — HYDROCODONE BITARTRATE AND ACETAMINOPHEN 500; 5 MG/1; MG/1
TABLET ORAL
Status: DISCONTINUED | OUTPATIENT
Start: 2025-07-08 | End: 2025-07-11 | Stop reason: HOSPADM

## 2025-07-08 NOTE — ED TRIAGE NOTES
Martín Thomason, a 59 y.o. male presents to the ED w/ complaint of low hemoglobin from recent blood draw. Pt states he's been on a high iron diet for 2 weeks and doesn't feel symptomatic. Stool are normal, last BM was today at 1400.    Triage note:  Chief Complaint   Patient presents with    Abnormal Lab     Hgb 5.6,  brown stool     Review of patient's allergies indicates:  No Known Allergies  History reviewed. No pertinent past medical history.

## 2025-07-08 NOTE — TELEPHONE ENCOUNTER
Informed by Anesthesia that H and H is too low to sedate on Thursday, July 10th, procedure needs to be cancelled and rescheduled at [facility]. After patient received transfusion.    Attempted to call patient and address this with him, but had to leave voice message.   Attempted to call daughter but unable to leave message.  Spoke with Significant other Ms. Sheehan and informed her the procedure would be cancelled.  And As per PCP and Anesthesia MD advice patient needs to go to ER today due to critically low H and H. She stated patient has been sluggish lately and slow when attempting to stand.  Informed her of current critical value to report to Triage nurse when they arrive at ER.  Ms. Sheehan verbalized understanding.

## 2025-07-08 NOTE — FIRST PROVIDER EVALUATION
Medical screening examination initiated.  I have conducted a focused provider triage encounter, findings are as follows:    Brief history of present illness:  hgb 5.6 11 days ago, he was told he needs it rechecked    There were no vitals filed for this visit.    Pertinent physical exam:  NAD    Brief workup plan:  labs    Preliminary workup initiated; this workup will be continued and followed by the physician or advanced practice provider that is assigned to the patient when roomed.

## 2025-07-09 ENCOUNTER — ANESTHESIA EVENT (OUTPATIENT)
Dept: ENDOSCOPY | Facility: HOSPITAL | Age: 60
End: 2025-07-09
Payer: COMMERCIAL

## 2025-07-09 PROBLEM — N18.31 STAGE 3A CHRONIC KIDNEY DISEASE: Status: RESOLVED | Noted: 2025-04-21 | Resolved: 2025-07-09

## 2025-07-09 LAB
ABO + RH BLD: NORMAL
ABO + RH BLD: NORMAL
ABORH RETYPE: NORMAL
ABSOLUTE EOSINOPHIL (OHS): 0.08 K/UL
ABSOLUTE EOSINOPHIL (OHS): 0.14 K/UL
ABSOLUTE EOSINOPHIL (OHS): 0.18 K/UL
ABSOLUTE MONOCYTE (OHS): 0.78 K/UL (ref 0.3–1)
ABSOLUTE MONOCYTE (OHS): 0.8 K/UL (ref 0.3–1)
ABSOLUTE MONOCYTE (OHS): 0.89 K/UL (ref 0.3–1)
ABSOLUTE NEUTROPHIL COUNT (OHS): 3.67 K/UL (ref 1.8–7.7)
ABSOLUTE NEUTROPHIL COUNT (OHS): 5.06 K/UL (ref 1.8–7.7)
ABSOLUTE NEUTROPHIL COUNT (OHS): 7.51 K/UL (ref 1.8–7.7)
ALBUMIN SERPL BCP-MCNC: 3.2 G/DL (ref 3.5–5.2)
ALP SERPL-CCNC: 72 UNIT/L (ref 40–150)
ALT SERPL W/O P-5'-P-CCNC: 29 UNIT/L (ref 10–44)
ANION GAP (OHS): 8 MMOL/L (ref 8–16)
AST SERPL-CCNC: 47 UNIT/L (ref 11–45)
BASOPHILS # BLD AUTO: 0.06 K/UL
BASOPHILS # BLD AUTO: 0.06 K/UL
BASOPHILS # BLD AUTO: 0.11 K/UL
BASOPHILS NFR BLD AUTO: 0.8 %
BASOPHILS NFR BLD AUTO: 0.9 %
BASOPHILS NFR BLD AUTO: 1.1 %
BILIRUB SERPL-MCNC: 0.9 MG/DL (ref 0.1–1)
BLD PROD TYP BPU: NORMAL
BLD PROD TYP BPU: NORMAL
BLOOD UNIT EXPIRATION DATE: NORMAL
BLOOD UNIT EXPIRATION DATE: NORMAL
BLOOD UNIT TYPE CODE: 5100
BLOOD UNIT TYPE CODE: 5100
BUN SERPL-MCNC: 12 MG/DL (ref 8–23)
CALCIUM SERPL-MCNC: 8.8 MG/DL (ref 8.7–10.5)
CHLORIDE SERPL-SCNC: 107 MMOL/L (ref 95–110)
CO2 SERPL-SCNC: 23 MMOL/L (ref 23–29)
CREAT SERPL-MCNC: 0.9 MG/DL (ref 0.5–1.4)
CROSSMATCH INTERPRETATION: NORMAL
CROSSMATCH INTERPRETATION: NORMAL
DISPENSE STATUS: NORMAL
DISPENSE STATUS: NORMAL
ERYTHROCYTE [DISTWIDTH] IN BLOOD BY AUTOMATED COUNT: 24.9 % (ref 11.5–14.5)
ERYTHROCYTE [DISTWIDTH] IN BLOOD BY AUTOMATED COUNT: 25.1 % (ref 11.5–14.5)
ERYTHROCYTE [DISTWIDTH] IN BLOOD BY AUTOMATED COUNT: 25.4 % (ref 11.5–14.5)
FERRITIN SERPL-MCNC: 10 NG/ML (ref 20–300)
GFR SERPLBLD CREATININE-BSD FMLA CKD-EPI: >60 ML/MIN/1.73/M2
GLUCOSE SERPL-MCNC: 107 MG/DL (ref 70–110)
HCT VFR BLD AUTO: 23.1 % (ref 40–54)
HCT VFR BLD AUTO: 26.2 % (ref 40–54)
HCT VFR BLD AUTO: 27.7 % (ref 40–54)
HGB BLD-MCNC: 6.1 GM/DL (ref 14–18)
HGB BLD-MCNC: 7.2 GM/DL (ref 14–18)
HGB BLD-MCNC: 7.7 GM/DL (ref 14–18)
HOLD SPECIMEN: NORMAL
IMM GRANULOCYTES # BLD AUTO: 0.02 K/UL (ref 0–0.04)
IMM GRANULOCYTES # BLD AUTO: 0.03 K/UL (ref 0–0.04)
IMM GRANULOCYTES # BLD AUTO: 0.04 K/UL (ref 0–0.04)
IMM GRANULOCYTES NFR BLD AUTO: 0.3 % (ref 0–0.5)
IMM GRANULOCYTES NFR BLD AUTO: 0.4 % (ref 0–0.5)
IMM GRANULOCYTES NFR BLD AUTO: 0.4 % (ref 0–0.5)
LYMPHOCYTES # BLD AUTO: 1.56 K/UL (ref 1–4.8)
LYMPHOCYTES # BLD AUTO: 1.67 K/UL (ref 1–4.8)
LYMPHOCYTES # BLD AUTO: 1.8 K/UL (ref 1–4.8)
MAGNESIUM SERPL-MCNC: 1.6 MG/DL (ref 1.6–2.6)
MCH RBC QN AUTO: 18.8 PG (ref 27–31)
MCH RBC QN AUTO: 19.9 PG (ref 27–31)
MCH RBC QN AUTO: 20 PG (ref 27–31)
MCHC RBC AUTO-ENTMCNC: 26.4 G/DL (ref 32–36)
MCHC RBC AUTO-ENTMCNC: 27.5 G/DL (ref 32–36)
MCHC RBC AUTO-ENTMCNC: 27.8 G/DL (ref 32–36)
MCV RBC AUTO: 71 FL (ref 82–98)
MCV RBC AUTO: 72 FL (ref 82–98)
MCV RBC AUTO: 73 FL (ref 82–98)
NUCLEATED RBC (/100WBC) (OHS): 0 /100 WBC
NUCLEATED RBC (/100WBC) (OHS): 1 /100 WBC
NUCLEATED RBC (/100WBC) (OHS): 1 /100 WBC
OHS QRS DURATION: 86 MS
OHS QTC CALCULATION: 443 MS
PHOSPHATE SERPL-MCNC: 3.3 MG/DL (ref 2.7–4.5)
PLATELET # BLD AUTO: 230 K/UL (ref 150–450)
PLATELET # BLD AUTO: 231 K/UL (ref 150–450)
PLATELET # BLD AUTO: 263 K/UL (ref 150–450)
PMV BLD AUTO: 9.3 FL (ref 9.2–12.9)
PMV BLD AUTO: 9.6 FL (ref 9.2–12.9)
PMV BLD AUTO: 9.9 FL (ref 9.2–12.9)
POTASSIUM SERPL-SCNC: 4 MMOL/L (ref 3.5–5.1)
PROT SERPL-MCNC: 7.2 GM/DL (ref 6–8.4)
RBC # BLD AUTO: 3.24 M/UL (ref 4.6–6.2)
RBC # BLD AUTO: 3.61 M/UL (ref 4.6–6.2)
RBC # BLD AUTO: 3.85 M/UL (ref 4.6–6.2)
RELATIVE EOSINOPHIL (OHS): 0.8 %
RELATIVE EOSINOPHIL (OHS): 1.8 %
RELATIVE EOSINOPHIL (OHS): 2.8 %
RELATIVE LYMPHOCYTE (OHS): 15.3 % (ref 18–48)
RELATIVE LYMPHOCYTE (OHS): 22.9 % (ref 18–48)
RELATIVE LYMPHOCYTE (OHS): 26.1 % (ref 18–48)
RELATIVE MONOCYTE (OHS): 12.5 % (ref 4–15)
RELATIVE MONOCYTE (OHS): 8.7 % (ref 4–15)
RELATIVE MONOCYTE (OHS): 9.9 % (ref 4–15)
RELATIVE NEUTROPHIL (OHS): 57.4 % (ref 38–73)
RELATIVE NEUTROPHIL (OHS): 64.2 % (ref 38–73)
RELATIVE NEUTROPHIL (OHS): 73.7 % (ref 38–73)
SODIUM SERPL-SCNC: 138 MMOL/L (ref 136–145)
UNIT NUMBER: NORMAL
UNIT NUMBER: NORMAL
WBC # BLD AUTO: 10.19 K/UL (ref 3.9–12.7)
WBC # BLD AUTO: 6.4 K/UL (ref 3.9–12.7)
WBC # BLD AUTO: 7.87 K/UL (ref 3.9–12.7)

## 2025-07-09 PROCEDURE — 85025 COMPLETE CBC W/AUTO DIFF WBC: CPT

## 2025-07-09 PROCEDURE — 11000001 HC ACUTE MED/SURG PRIVATE ROOM

## 2025-07-09 PROCEDURE — P9016 RBC LEUKOCYTES REDUCED: HCPCS | Performed by: PHYSICIAN ASSISTANT

## 2025-07-09 PROCEDURE — 82728 ASSAY OF FERRITIN: CPT

## 2025-07-09 PROCEDURE — 80053 COMPREHEN METABOLIC PANEL: CPT

## 2025-07-09 PROCEDURE — 86920 COMPATIBILITY TEST SPIN: CPT | Performed by: PHYSICIAN ASSISTANT

## 2025-07-09 PROCEDURE — 93005 ELECTROCARDIOGRAM TRACING: CPT

## 2025-07-09 PROCEDURE — 93010 ELECTROCARDIOGRAM REPORT: CPT | Mod: ,,, | Performed by: INTERNAL MEDICINE

## 2025-07-09 PROCEDURE — 83735 ASSAY OF MAGNESIUM: CPT

## 2025-07-09 PROCEDURE — 99223 1ST HOSP IP/OBS HIGH 75: CPT | Mod: ,,, | Performed by: STUDENT IN AN ORGANIZED HEALTH CARE EDUCATION/TRAINING PROGRAM

## 2025-07-09 PROCEDURE — 25000003 PHARM REV CODE 250

## 2025-07-09 PROCEDURE — 84100 ASSAY OF PHOSPHORUS: CPT

## 2025-07-09 PROCEDURE — 36415 COLL VENOUS BLD VENIPUNCTURE: CPT

## 2025-07-09 PROCEDURE — 36430 TRANSFUSION BLD/BLD COMPNT: CPT

## 2025-07-09 PROCEDURE — 30233N1 TRANSFUSION OF NONAUTOLOGOUS RED BLOOD CELLS INTO PERIPHERAL VEIN, PERCUTANEOUS APPROACH: ICD-10-PCS | Performed by: EMERGENCY MEDICINE

## 2025-07-09 RX ORDER — POLYETHYLENE GLYCOL 3350, SODIUM SULFATE ANHYDROUS, SODIUM BICARBONATE, SODIUM CHLORIDE, POTASSIUM CHLORIDE 236; 22.74; 6.74; 5.86; 2.97 G/4L; G/4L; G/4L; G/4L; G/4L
4000 POWDER, FOR SOLUTION ORAL ONCE
Status: COMPLETED | OUTPATIENT
Start: 2025-07-09 | End: 2025-07-09

## 2025-07-09 RX ORDER — COLCHICINE 0.6 MG/1
0.6 TABLET ORAL DAILY
COMMUNITY
Start: 2025-05-03

## 2025-07-09 RX ADMIN — POLYETHYLENE GLYCOL 3350, SODIUM SULFATE ANHYDROUS, SODIUM BICARBONATE, SODIUM CHLORIDE, POTASSIUM CHLORIDE 4000 ML: 236; 22.74; 6.74; 5.86; 2.97 POWDER, FOR SOLUTION ORAL at 06:07

## 2025-07-09 NOTE — HPI
59-year-old male presents to [facility] [place] under the instruction of his PCP for blood transfusions secondary to anemia (Hgb < 7). He had a hemoglobin of 5.6 that was drawn 11 days ago, he says at this time he felt short of breath and very tired. He opted to take iron supplements and eat iron heavy foods. He reports to me that he has a history of internal hemorrhoids for which he noticed bright red blood in his stools about 3 weeks prior. He applied a rectal cream which he says helped alleviate his dyschezia and hematochezia. He denies any further episodes since. States that he has had several colonoscopies previously has never had any issues. He has never required a blood transfusion. He denies lightheadedness, chest pain, shortness of breath, fatigue. Of note, the patient has a colonoscopy scheduled for 7/10.     In the ED he presented with VSS, Hgb 6.6, al other lab work and imaging un-remarkable.

## 2025-07-09 NOTE — PHARMACY MED REC
"          Admission Medication History     The home medication history was taken by Lois Sequeira.    You may go to "Admission" then "Reconcile Home Medications" tabs to review and/or act upon these items.     The home medication list has been updated by the Pharmacy department.   Please read ALL comments highlighted in yellow.   Please address this information as you see fit.    Feel free to contact us if you have any questions or require assistance.      Medications listed below were obtained from: Patient/family and Analytic software- [employer]  Current Outpatient Medications on File Prior to Encounter   Medication Sig    colchicine (COLCRYS) 0.6 mg tablet Take 0.6 mg by mouth once daily.         Lois Sequeira  [id number]        .          "

## 2025-07-09 NOTE — SUBJECTIVE & OBJECTIVE
History reviewed. No pertinent past medical history.    Past Surgical History:   Procedure Laterality Date    COLONOSCOPY, SCREENING, LOW RISK PATIENT N/A 7/3/2025    Procedure: COLONOSCOPY, SCREENING, LOW RISK PATIENT;  Surgeon: Christelle Valencia MD;  Location: [facility] ENDOSCOPY;  Service: Endoscopy;  Laterality: N/A;  7/1 pre call complete [facility]    KNEE ARTHROPLASTY Right 09/1998       Review of patient's allergies indicates:  No Known Allergies    No current facility-administered medications on file prior to encounter.     Current Outpatient Medications on File Prior to Encounter   Medication Sig    hydroCHLOROthiazide (HYDRODIURIL) 12.5 MG Tab Take 12.5 mg by mouth once daily.    losartan (COZAAR) 100 MG tablet Take 100 mg by mouth once daily.     Family History    None       Tobacco Use    Smoking status: Never    Smokeless tobacco: Never   Substance and Sexual Activity    Alcohol use: Yes     Alcohol/week: 12.0 standard drinks of alcohol     Types: 10 Glasses of wine, 2 Shots of liquor per week    Drug use: Never    Sexual activity: Yes     Review of Systems   All other systems reviewed and are negative.    Objective:     Vital Signs (Most Recent):  Temp: 98.6 °F (37 °C) (07/08/25 2314)  Pulse: 104 (07/08/25 2314)  Resp: 18 (07/08/25 1640)  BP: (!) 149/84 (07/08/25 2314)  SpO2: 99 % (07/08/25 2314) Vital Signs (24h Range):  Temp:  [97.5 °F (36.4 °C)-98.6 °F (37 °C)] 98.6 °F (37 °C)  Pulse:  [104-109] 104  Resp:  [18] 18  SpO2:  [99 %-100 %] 99 %  BP: (108-149)/(60-84) 149/84     Weight: 115.7 kg (255 lb)  Body mass index is 34.58 kg/m².     Physical Exam  Vitals and nursing note reviewed.   Constitutional:       General: He is not in acute distress.     Appearance: Normal appearance. He is not ill-appearing.   Eyes:      General: No scleral icterus.  Cardiovascular:      Rate and Rhythm: Normal rate and regular rhythm.      Heart sounds: No murmur heard.     No friction rub. No gallop.   Pulmonary:      Effort:  Pulmonary effort is normal.      Breath sounds: Normal breath sounds. No wheezing, rhonchi or rales.   Abdominal:      Tenderness: There is no abdominal tenderness. There is no guarding.      Hernia: A hernia is present.   Musculoskeletal:      Right lower leg: No edema.      Left lower leg: No edema.   Neurological:      General: No focal deficit present.      Mental Status: He is alert.                Significant Labs: All pertinent labs within the past 24 hours have been reviewed.  CBC:   Recent Labs   Lab 07/08/25  2032   WBC 6.22   HGB 6.6*   HCT 25.5*        CMP:   Recent Labs   Lab 07/08/25  2229      K 3.8      CO2 21*   *   BUN 11   CREATININE 0.9   CALCIUM 8.5*   PROT 7.8   ALBUMIN 3.5   BILITOT 0.6   ALKPHOS 77   AST 49*   ALT 30   ANIONGAP 10       Significant Imaging: I have reviewed all pertinent imaging results/findings within the past 24 hours.

## 2025-07-09 NOTE — ASSESSMENT & PLAN NOTE
Martín Thomason is a 60yo M with a Hx of iron deficiency anemia and internal hemorrhoids who came to the emergency department with chief complaint of abnormal lab value (Hgb of 5.6). GI was consulted for potential colonoscopy inpatient since his Hgb has been dropping from 6.6 on admission to 6.1 after blood trasfusion. Pt overall is doing okay.. denies any serious fatigue symptoms or blood in stool.       Recommendation:  - In the setting of pt's anemia we will plan for inpatient colonoscopy and EGD tomorrow  - Clear liquid diet   - NPO at midnight   - Golytely prep to start at 6pm, to be completed within 4 hours if possible  - Monitor Hgb q8hrs  - Transfuse to keep Hgb >7, plts >50  - Hold anticoagulants if safe to do so per primary team  - If becomes hemodynamically unstable with associated large volume hematochezia, recommend STAT CTA and IR embolization if positive

## 2025-07-09 NOTE — ASSESSMENT & PLAN NOTE
Anemia is likely due to Iron deficiency. Most recent hemoglobin and hematocrit are listed below.  Recent Labs     07/08/25 2032   HGB 6.6*   HCT 25.5*     Plan  - Monitor serial CBC: Every 8 hours  - Transfuse PRBC if patient becomes hemodynamically unstable, symptomatic or H/H drops below 7/21.  - Patient has received 2 units of PRBCs on 7/8  - Patient's anemia is currently stable  - Repeat Iron studies on    - Ferritin 12.0   - TIBC 471   - Fe 13  - Patient with colonoscopy scheduled on 7/10  - Will defer GI consultation during admission as patient is HDS with no evidence of active bleeding. GI consult reasonable should patient H/H continue to decline despite appropriate transfusion and/or patient becomes hemodynamically unstable.

## 2025-07-09 NOTE — ASSESSMENT & PLAN NOTE
Patient with history of orthostasis, however no signs/symptoms of this inpatient. CTM and low threshold for repeating orthostatics should suspicion arise.

## 2025-07-09 NOTE — ED NOTES
Received report and assumed care of patient at this time. Pt presents to ED w/ c/o abnormal lab. Patient is AAOx4 with a calm affect and aware of environment. Airway is open and patent, respirations are spontaneous, normal effort and rate noted. Pt denies chest pain at this time. Skin warm and dry. Movement to all extremities noted. Bed placed in low position, side rails up x 2, call light is within reach of patient. Explanation of care provided to patient, family at bedside. Patient offers no complaints at this time. Awaiting further MD orders and bed assignment, POC continues.

## 2025-07-09 NOTE — HOSPITAL COURSE
59-year-old male presents to [facility] under the instruction of his PCP for blood transfusions secondary to anemia (Hgb < 7). He had a hemoglobin of 5.6 that was drawn 11 days ago, he says at this time he felt short of breath and very tired. In the ED he presented with VSS, Hgb 6.6, MCV 70, Fe 13, TIBC 471, transferrin 318, ferritin 12, retic 4.4. Patient received 2 units of PRBCs. Hgb after 2 units of PRBC increased to 7.2. Patient was scheduled for colonoscopy outpatient for 7/10, but GI was consulted and will plan to do colonoscopy inpatient on 7/10. Colonoscopy showed partially obstructing tumor-lesion in the ascending colon highly concerning for malignancy which was biopsied and tattooed. C-scope also showed diverticulosis in the entire examined colon, and a single non-bleeding colonic angioectasia.  For colonic lesion, colorectal surgery team was consulted.  They recommended CT chest and abdomen/pelvis.  CTAP with IV contrast showed abnormal thickening of the ascending colon corresponding to site of recently identified colon neoplasm with adjacent mesenteric fat stranding and pathologically enlarged mesenteric lymph nodes adjacent to the ascending colon, and two ill-defined hypoattenuating lesions in the liver which were not definitively seen on remote prior CT exam, suspicious for metastatic disease. CEA minimally elevated at 8.4.   This morning CRS recommending MRI abdomen for further clarification of liver lesions. MRI unable to be obtained timely today and pt is eager to go home. MRI of abdomen scheduled outpatient- told by case management this will be done next Wednesday. Referral to Oncology outpatient.  This morning patient's hemoglobin is 6.9.  Decision to transfuse another 1 unit PRBC. Hgb > 8 on recheck. Patient hemodynamically stable and ready for discharge. Pt deemed appropriate for discharge. Plan discussed with pt, who was agreeable; medications were discussed and reviewed. Outpatient follow-up  discussed and scheduled when able. ER precautions were given. Patient discharged.

## 2025-07-09 NOTE — ED PROVIDER NOTES
Encounter Date: 7/8/2025       History     Chief Complaint   Patient presents with    Abnormal Lab     Hgb 5.6,  brown stool     59-year-old male comes to the emergency department with chief complaint of abnormal lab value.  He had a hemoglobin of 5.6 that was drawn 11 days ago.  His PCP told him to come to the hospital as he may need a blood transfusion.  States that he feels well currently.  He denies chest pain, shortness of breath, lightheadedness, dizziness.  His doctor put him on a high iron diet.  He has been eating more red meat and taking iron supplementation.  He denies any hematochezia or melena.  Denies issues with GI bleeding in the past.  States that he has had several colonoscopies previously has never had any issues.  He has never required a blood transfusion.  He denies other worsening or alleviating factors.        Review of patient's allergies indicates:  No Known Allergies  History reviewed. No pertinent past medical history.  Past Surgical History:   Procedure Laterality Date    COLONOSCOPY N/A 7/10/2025    Procedure: COLONOSCOPY;  Surgeon: Yamil Amor MD;  Location: [address]);  Service: Endoscopy;  Laterality: N/A;    COLONOSCOPY, SCREENING, LOW RISK PATIENT N/A 7/3/2025    Procedure: COLONOSCOPY, SCREENING, LOW RISK PATIENT;  Surgeon: Christelle Valencia MD;  Location: [facility] ENDOSCOPY;  Service: Endoscopy;  Laterality: N/A;  7/1 pre call complete [facility]    ESOPHAGOGASTRODUODENOSCOPY N/A 7/10/2025    Procedure: EGD (ESOPHAGOGASTRODUODENOSCOPY);  Surgeon: Yamil Amor MD;  Location: [address]);  Service: Endoscopy;  Laterality: N/A;    KNEE ARTHROPLASTY Right 09/1998     No family history on file.  Social History[1]  Review of Systems   Respiratory:  Negative for shortness of breath.    Cardiovascular:  Negative for chest pain.   Gastrointestinal:  Negative for blood in stool.   Neurological:  Negative for light-headedness.       Physical Exam     Initial Vitals  [07/08/25 1640]   BP Pulse Resp Temp SpO2   108/60 109 18 97.5 °F (36.4 °C) 100 %      MAP       --         Physical Exam    Vitals reviewed.  Constitutional: He appears well-developed and well-nourished. He is not diaphoretic. No distress.   HENT:   Head: Normocephalic and atraumatic. Mouth/Throat: Oropharynx is clear and moist.   Eyes: EOM are normal. Pupils are equal, round, and reactive to light.   Neck: Neck supple.   Normal range of motion.  Cardiovascular:  Normal rate, regular rhythm, normal heart sounds and intact distal pulses.     Exam reveals no gallop and no friction rub.       No murmur heard.  Pulmonary/Chest: Breath sounds normal. He has no wheezes. He has no rhonchi. He has no rales.   Abdominal: Abdomen is soft. Bowel sounds are normal. There is no abdominal tenderness.   Genitourinary:    Genitourinary Comments: Rectal exam performed with nursing chaperone present.  Brown stool in rectal vault.  Guaiac positive.     Musculoskeletal:         General: Normal range of motion.      Cervical back: Normal range of motion and neck supple.     Neurological: He is alert and oriented to person, place, and time. He has normal strength. GCS score is 15. GCS eye subscore is 4. GCS verbal subscore is 5. GCS motor subscore is 6.   Skin: Skin is warm and dry. Capillary refill takes less than 2 seconds.   Psychiatric: He has a normal mood and affect. His behavior is normal. Judgment and thought content normal.         ED Course   Procedures  Labs Reviewed   COMPREHENSIVE METABOLIC PANEL - Abnormal       Result Value    Sodium 136      Potassium 3.8      Chloride 105      CO2 21 (*)     Glucose 113 (*)     BUN 11      Creatinine 0.9      Calcium 8.5 (*)     Protein Total 7.8      Albumin 3.5      Bilirubin Total 0.6      ALP 77      AST 49 (*)     ALT 30      Anion Gap 10      eGFR >60     CBC WITH DIFFERENTIAL - Abnormal    WBC 6.22      RBC 3.64 (*)     HGB 6.6 (*)     HCT 25.5 (*)     MCV 70 (*)     MCH 18.1 (*)      MCHC 25.9 (*)     RDW 25.5 (*)     Platelet Count 298      MPV 9.7      Nucleated RBC 1 (*)     Neut % 54.2      Lymph % 32.3      Mono % 8.4      Eos % 2.7      Basophil % 1.3      Imm Grans % 1.1 (*)     Neut # 3.37      Lymph # 2.01      Mono # 0.52      Eos # 0.17      Baso # 0.08      Imm Grans # 0.07 (*)    FERRITIN - Abnormal    Ferritin 12.0 (*)    IRON AND TIBC - Abnormal    Iron Level 13 (*)     Transferrin 318      Iron Binding Capacity Total 471 (*)     Iron Saturation 3 (*)    RETICULOCYTES - Abnormal    Retic Count, Automated 4.4 (*)    FERRITIN - Abnormal    Ferritin 10.0 (*)    CBC WITH DIFFERENTIAL - Abnormal    WBC 6.40      RBC 3.24 (*)     HGB 6.1 (*)     HCT 23.1 (*)     MCV 71 (*)     MCH 18.8 (*)     MCHC 26.4 (*)     RDW 25.4 (*)     Platelet Count 230      MPV 9.6      Nucleated RBC 1 (*)     Neut % 57.4      Lymph % 26.1      Mono % 12.5      Eos % 2.8      Basophil % 0.9      Imm Grans % 0.3      Neut # 3.67      Lymph # 1.67      Mono # 0.80      Eos # 0.18      Baso # 0.06      Imm Grans # 0.02     COMPREHENSIVE METABOLIC PANEL - Abnormal    Sodium 138      Potassium 4.0      Chloride 107      CO2 23      Glucose 107      BUN 12      Creatinine 0.9      Calcium 8.8      Protein Total 7.2      Albumin 3.2 (*)     Bilirubin Total 0.9      ALP 72      AST 47 (*)     ALT 29      Anion Gap 8      eGFR >60     CBC WITH DIFFERENTIAL - Abnormal    WBC 7.87      RBC 3.61 (*)     HGB 7.2 (*)     HCT 26.2 (*)     MCV 73 (*)     MCH 19.9 (*)     MCHC 27.5 (*)     RDW 24.9 (*)     Platelet Count 231      MPV 9.3      Nucleated RBC 1 (*)     Neut % 64.2      Lymph % 22.9      Mono % 9.9      Eos % 1.8      Basophil % 0.8      Imm Grans % 0.4      Neut # 5.06      Lymph # 1.80      Mono # 0.78      Eos # 0.14      Baso # 0.06      Imm Grans # 0.03     URINALYSIS, REFLEX TO URINE CULTURE - Normal    Color, UA Yellow      Appearance, UA Clear      pH, UA 6.0      Spec Grav UA 1.020      Protein, UA  Negative      Glucose, UA Negative      Ketones, UA Negative      Bilirubin, UA Negative      Blood, UA Negative      Nitrites, UA Negative      Urobilinogen, UA Negative      Leukocyte Esterase, UA Negative     MAGNESIUM - Normal    Magnesium  1.6     PHOSPHORUS - Normal    Phosphorus Level 3.3     CBC W/ AUTO DIFFERENTIAL    Narrative:     The following orders were created for panel order CBC auto differential.  Procedure                               Abnormality         Status                     ---------                               -----------         ------                     CBC with Differential[[id number]]       Abnormal            Final result                 Please view results for these tests on the individual orders.   MORPHOLOGY    Platelet Estimate Appears Normal      Anisocytosis Slight      Poik Slight      Polychromasia Occasional      Hypochromia Occasional      Ovalocytes Occasional     CBC W/ AUTO DIFFERENTIAL    Narrative:     The following orders were created for panel order CBC with Automated Differential.  Procedure                               Abnormality         Status                     ---------                               -----------         ------                     CBC with Differential[[id number]]       Abnormal            Final result                 Please view results for these tests on the individual orders.   CBC W/ AUTO DIFFERENTIAL    Narrative:     The following orders were created for panel order CBC with Automated Differential.  Procedure                               Abnormality         Status                     ---------                               -----------         ------                     CBC with Differential[[id number]]       Abnormal            Final result                 Please view results for these tests on the individual orders.   TYPE & SCREEN    Specimen Outdate 07/11/2025 23:59      Group & Rh O POS      Indirect Mor NEG     ABORH RETYPE     ABORH Retype O POS     PREPARE RBC SOFT    UNIT NUMBER [id number]      UNIT ABO/RH O POS      DISPENSE STATUS Transfused      Unit Expiration [id number]      Product Code O8998C14      Unit Blood Type Code 5100      CROSSMATCH INTERPRETATION Compatible      UNIT NUMBER [id number]      UNIT ABO/RH O POS      DISPENSE STATUS Transfused      Unit Expiration [id number]      Product Code E5552U05      Unit Blood Type Code 5100      CROSSMATCH INTERPRETATION Compatible          ECG Results              EKG 12-lead (Final result)        Collection Time Result Time QRS Duration OHS QTC Calculation    07/09/25 07:25:29 07/09/25 08:35:19 86 443                     Final result by Interface, Lab In [facility] (07/09/25 08:35:24)                   Narrative:    Test Reason : R00.0,    Vent. Rate : 102 BPM     Atrial Rate : 102 BPM     P-R Int : 194 ms          QRS Dur :  86 ms      QT Int : 340 ms       P-R-T Axes :  40  91   3 degrees    QTcB Int : 443 ms    Sinus tachycardia  Rightward axis  Nonspecific ST and/or T wave abnormalities  Abnormal ECG  When compared with ECG of 13-Jan-2025 20:28,  RI interval has decreased  Confirmed by Jeffy Loyola ([id number]) on 7/9/2025 8:35:14 AM    Referred By: AAAREFERRAL SELF           Confirmed By: Jeffy Loyola                                  Imaging Results    None          Medications   polyethylene glycol (GoLYTELY) solution (4,000 mLs Oral Given 7/9/25 1800)   lactated ringers bolus 500 mL (0 mLs Intravenous Stopped 7/10/25 1438)   ferumoxytol (Feraheme) 510 mg in D5W 117 mL IVPB (0 mg Intravenous Stopped 7/10/25 1508)   iohexoL (OMNIPAQUE 350) injection 100 mL (100 mLs Intravenous Given 7/10/25 1730)   magnesium sulfate 2g in water 50mL IVPB (premix) (0 g Intravenous Stopped 7/11/25 1216)     Medical Decision Making  Emergent evaluation of a 59 y.o. male presenting to the emergency department complaining of anemia. Patient is afebrile, hemodynamically stable, and non toxic  appearing.   Will order labs.     Differential diagnosis includes but isn't limited to GI bleed, anemia of chronic disease, malignancy.    Hemoglobin of 6.6.  Hemoglobin 11 days ago was 5.6.  Prior to that, his hemoglobin was 8.5.  He denies any blood in his stool.  Rectal exam performed with nursing chaperone present.  There is brown stool in the rectal vault.  It is guaiac positive.  No overt bleeding.  I suspect a slow GI bleed could be contributing to this?  Blood consent obtained.  Will order and transfuse 2 units PRBCs.  Due to his drop in hemoglobin, he does meet inpatient criteria.  Will admit to Hospital Medicine for further management and blood transfusion.  Patient is agreeable with plan.  All questions answered.    Risk  Prescription drug management.  Decision regarding hospitalization.              Attending Attestation:         Attending Critical Care:   Critical Care Times:   ==============================================================  Total Critical Care Time - exclusive of procedural time: 35 minutes.  ==============================================================  Critical care reasons: severe anemia requring transfusion.   Critical care was time spent personally by me on the following activities: development of treatment plan with patient or relative, ordering and performing treatments and interventions, evaluation of patient's response to treatment and re-evaluation of patient's conition.   Critical Care Condition: potentially life-threatening       Attending ED Notes:   For this encounter, I discussed the history and physical with my NP/PA and reviewed the their documentation, treatment plan and medical decision making. Face to face encounter provided by PA    Suspected gi bleed with severe anemia  Transfusion ordered  Admit to AWILDA Mclean MD  Staff ED Physician  07/09/2025 2:35 PM                                   Clinical Impression:  Final diagnoses:  [D64.9] Anemia          ED  Disposition Condition    Admit                     Odilia King PA-C  07/08/25 2217       Macey Mclean MD  07/09/25 4122         [1]   Social History  Tobacco Use    Smoking status: Never    Smokeless tobacco: Never   Substance Use Topics    Alcohol use: Yes     Alcohol/week: 12.0 standard drinks of alcohol     Types: 10 Glasses of wine, 2 Shots of liquor per week    Drug use: Never        Macey Mclean MD  07/25/25 5812

## 2025-07-09 NOTE — SUBJECTIVE & OBJECTIVE
History reviewed. No pertinent past medical history.    Past Surgical History:   Procedure Laterality Date    COLONOSCOPY, SCREENING, LOW RISK PATIENT N/A 7/3/2025    Procedure: COLONOSCOPY, SCREENING, LOW RISK PATIENT;  Surgeon: Christelle Valencia MD;  Location: [facility] ENDOSCOPY;  Service: Endoscopy;  Laterality: N/A;  7/1 pre call complete H    KNEE ARTHROPLASTY Right 09/1998       Review of patient's allergies indicates:  No Known Allergies  Family History    None       Tobacco Use    Smoking status: Never    Smokeless tobacco: Never   Substance and Sexual Activity    Alcohol use: Yes     Alcohol/week: 12.0 standard drinks of alcohol     Types: 10 Glasses of wine, 2 Shots of liquor per week    Drug use: Never    Sexual activity: Yes     Review of Systems   Constitutional: Negative.    HENT: Negative.     Eyes: Negative.    Respiratory: Negative.     Cardiovascular: Negative.    Gastrointestinal: Negative.    Endocrine: Negative.    Genitourinary: Negative.    Musculoskeletal: Negative.    Allergic/Immunologic: Negative.    Neurological: Negative.    Hematological: Negative.    Psychiatric/Behavioral: Negative.       Objective:     Vital Signs (Most Recent):  Temp: 99.2 °F (37.3 °C) (07/09/25 0710)  Pulse: 102 (07/09/25 0710)  Resp: 18 (07/09/25 0710)  BP: (!) 146/91 (07/09/25 0710)  SpO2: 96 % (07/09/25 0710) Vital Signs (24h Range):  Temp:  [97.5 °F (36.4 °C)-99.5 °F (37.5 °C)] 99.2 °F (37.3 °C)  Pulse:  [] 102  Resp:  [18-22] 18  SpO2:  [96 %-100 %] 96 %  BP: (108-156)/(60-91) 146/91     Weight: 115.7 kg (255 lb) (07/08/25 1640)  Body mass index is 34.58 kg/m².      Intake/Output Summary (Last 24 hours) at 7/9/2025 1048  Last data filed at 7/9/2025 0544  Gross per 24 hour   Intake 1002 ml   Output --   Net 1002 ml       Lines/Drains/Airways       Peripheral Intravenous Line  Duration             Peripheral IV Single Lumen 07/08/25 2039 18 G 1 in Anterior;Right Forearm <1 day    Peripheral IV Single Lumen  07/08/25 2039 20 G 1 in Left Antecubital <1 day                     Physical Exam  Constitutional:       Appearance: Normal appearance.   HENT:      Head: Normocephalic and atraumatic.   Eyes:      Extraocular Movements: Extraocular movements intact.      Pupils: Pupils are equal, round, and reactive to light.   Cardiovascular:      Rate and Rhythm: Normal rate and regular rhythm.   Pulmonary:      Effort: Pulmonary effort is normal.      Breath sounds: Normal breath sounds.   Abdominal:      General: Abdomen is flat. Bowel sounds are normal.      Palpations: Abdomen is soft.   Neurological:      General: No focal deficit present.      Mental Status: He is alert and oriented to person, place, and time.   Psychiatric:         Mood and Affect: Mood normal.         Behavior: Behavior normal.         Thought Content: Thought content normal.         Judgment: Judgment normal.          Significant Labs:  CBC:   Recent Labs   Lab 07/08/25 2032 07/09/25 0444   WBC 6.22 6.40   HGB 6.6* 6.1*   HCT 25.5* 23.1*    230     BMP:   Recent Labs   Lab 07/09/25 0444         K 4.0      CO2 23   BUN 12   CREATININE 0.9   CALCIUM 8.8   MG 1.6     CMP:   Recent Labs   Lab 07/09/25 0444      CALCIUM 8.8   ALBUMIN 3.2*   PROT 7.2      K 4.0   CO2 23      BUN 12   CREATININE 0.9   ALKPHOS 72   ALT 29   AST 47*   BILITOT 0.9     Recent Lab Results  (Last 5 results in the past 24 hours)        07/09/25 0725 07/09/25 0444   07/08/25 2230 07/08/25 2229 07/08/25 2032        Unit Blood Type Code         5100  [P]                5100  [P]       Unit Expiration         [id number]  [P]                [id number]  [P]       ABO and RH       O POS         Albumin   3.2     3.5         ALP   72     77         ALT   29     30         Anion Gap   8     10         Aniso         Slight       Appearance, UA         Clear       AST   47     49         Baso #   0.06       0.08       Basophil %    0.9       1.3       Bilirubin (UA)         Negative       BILIRUBIN TOTAL   0.9  Comment: For infants and newborns, interpretation of results should be based   on gestational age, weight and in agreement with clinical   observations.    Premature Infant recommended reference ranges:   0-24 hours:  <8.0 mg/dL   24-48 hours: <12.0 mg/dL   3-5 days:    <15.0 mg/dL   6-29 days:   <15.0 mg/dL     0.6  Comment: For infants and newborns, interpretation of results should be based   on gestational age, weight and in agreement with clinical   observations.    Premature Infant recommended reference ranges:   0-24 hours:  <8.0 mg/dL   24-48 hours: <12.0 mg/dL   3-5 days:    <15.0 mg/dL   6-29 days:   <15.0 mg/dL         BUN   12     11         Calcium   8.8     8.5         Chloride   107     105         CO2   23     21         Color, UA         Yellow       Creatinine   0.9     0.9         Crossmatch Interpretation         Compatible  [P]                Compatible  [P]       DISPENSE STATUS         Issued  [P]                Issued  [P]       eGFR   >60  Comment: Estimated GFR calculated using the CKD-EPI creatinine (2021) equation.     >60  Comment: Estimated GFR calculated using the CKD-EPI creatinine (2021) equation.         Eos #   0.18       0.17       Eos %   2.8       2.7       Ferritin   10.0   12.0           Glucose   107     113         Glucose, UA         Negative       Gran # (ANC)   3.67       3.37       Group & Rh         O POS       Hematocrit   23.1       25.5       Hemoglobin   6.1       6.6       Hypo         Occasional       Immature Grans (Abs)   0.02  Comment: Mild elevation in immature granulocytes is non specific and can be seen in a variety of conditions including stress response, acute inflammation, trauma and pregnancy. Correlation with other laboratory and clinical findings is essential.       0.07  Comment: Mild elevation in immature granulocytes is non specific and can be seen in a variety of  conditions including stress response, acute inflammation, trauma and pregnancy. Correlation with other laboratory and clinical findings is essential.       Immature Granulocytes   0.3       1.1       INDIRECT HILLARY         NEG       Iron     13           Iron Saturation     3           Ketones, UA         Negative       Leukocyte Esterase, UA         Negative       Lymph #   1.67       2.01       Lymph %   26.1       32.3       Magnesium    1.6             MCH   18.8       18.1       MCHC   26.4       25.9       MCV   71       70       Mono #   0.80       0.52       Mono %   12.5       8.4       MPV   9.6       9.7       Neut %   57.4       54.2       NITRITE UA         Negative       nRBC   1       1       Blood, UA         Negative       QRS Duration 86               OHS QTC Calculation 443               Ovalocytes         Occasional       pH, UA         6.0       Phosphorus Level   3.3             Platelet Estimate         Appears Normal       Platelet Count   230       298       Poikilocytosis         Slight       Poly         Occasional       Potassium   4.0     3.8         Product Code         D7520C26  [P]                [id number]  [P]       PROTEIN TOTAL   7.2     7.8         Protein, UA         Negative  Comment: Recommend a 24 hour urine protein or a urine protein/creatinine ratio if globulin induced proteinuria is clinically suspected.       RBC   3.24       3.64       RDW   25.4       25.5       Retic         4.4       Sodium   138     136         Spec Grav UA         1.020       Specimen Outdate         07/11/2025 23:59       TIBC     471           Transferrin     318           Unit ABO/Rh         O POS  [P]                O POS  [P]       UNIT NUMBER         [id number]  [P]                [id number]  [P]       Urobilinogen, UA         Negative       WBC   6.40       6.22                               [P] - Preliminary Result               Significant Imaging:  Imaging results within the past 24  hours have been reviewed.

## 2025-07-09 NOTE — HPI
Martín Thomason is a 60yo M with a Hx of iron deficiency anemia and internal hemorrhoids who came to the emergency department with chief complaint of abnormal lab value. He had a hemoglobin of 5.6 that was drawn 11 days ago. His PCP told him to come to the hospital as he may need a blood transfusion. GI was consulted for potential colonoscopy inpatient since his Hgb has been dropping from 6.6 on admission to 6.1. When speaking with him today pt is feeling well. He's experienced no blood in his stool thus far and has never had any bleeding hx. Denies any n/v, fevers, or abd pain. Despite pt's low hemoglobin levels he's not experiencing any fatigue either and his appetite has been normal.

## 2025-07-09 NOTE — ANESTHESIA PREPROCEDURE EVALUATION
[facility]  Anesthesia Pre-Operative Evaluation     Patient Name: Martín Thomason  YOB: 1965  MRN: [id number]  CSN: [id number]       Admit Date: 7/8/2025   Admit Team: [place] [facility] 1  Hospital Day: 2  Date of Procedure: 7/10/2025  Anesthesia: General/MAC Procedure: Procedure(s) (LRB):  EGD (ESOPHAGOGASTRODUODENOSCOPY) (N/A)  COLONOSCOPY (N/A)  Pre-Operative Diagnosis: Anemia [D64.9]  Proceduralist:Surgeons and Role:     * Yamil Amor MD - Primary  Code Status: Full Code   Advanced Directive: <no information>  Isolation Precautions: No active isolations  Capacity: Full capacity       SUBJECTIVE:     Pre-operative evaluation for Procedure(s) (LRB):  EGD (ESOPHAGOGASTRODUODENOSCOPY) (N/A)  COLONOSCOPY (N/A)  07/09/2025  Hospital LOS: 1 days  ICU LOS: Patient does not have an ICU stay during this admission.    Martín Thomason is a 59 y.o. male w/ a significant PMHx of obesity, anemia, internal hemorrhoids who presented with hgb 5.1. No blood in stool per patient.    No N/V, abdominal pain, bloating, intolerance of supination. Appr NPO. Tolerated prep well without issue. Endoscopist to perform EGD, will tube if stomach contents noted. Discussed with patient, endoscopist, and CRNA    Good exercise tolerance      Otherwise healthy  No serious cardiopulm diagnosis    Patient now presents for the above procedure(s).    NPO Status:     TTE:  No results found for this or any previous visit.          Previous Airway: None documented.  Allergies:  Review of patient's allergies indicates:  No Known Allergies  Medications:     Current Outpatient Medications   Medication Instructions    colchicine (COLCRYS) 0.6 mg, Daily     Inpatient Medications:   polyethylene glycol  4,000 mL Oral Once     History:     Active Hospital Problems    Diagnosis  POA    *Anemia [D64.9]  Yes    Iron deficiency anemia [D50.9]  Unknown    Orthostasis [I95.1]  Yes    Abnormal liver enzymes [R74.8]  Yes     "  Resolved Hospital Problems   No resolved problems to display.     Medical History:  History reviewed. No pertinent past medical history.  Surgical History:    has a past surgical history that includes Knee Arthroplasty (Right, 09/1998) and colonoscopy, screening, low risk patient (N/A, 7/3/2025).   Social History:    reports being sexually active.  reports that he has never smoked. He has never used smokeless tobacco. He reports current alcohol use of about 12.0 standard drinks of alcohol per week. He reports that he does not use drugs.    OBJECTIVE:   Vital Signs Range (Last 24H):  Temp:  [36.4 °C (97.6 °F)-37.5 °C (99.5 °F)]   Pulse:  []   Resp:  [17-22]   BP: (130-164)/(69-99)   SpO2:  [96 %-100 %]   Lab Results   Component Value Date    WBC 10.19 07/09/2025    HGB 7.7 (L) 07/09/2025    HCT 27.7 (L) 07/09/2025     07/09/2025     07/09/2025    K 4.0 07/09/2025     07/09/2025    CREATININE 0.9 07/09/2025    BUN 12 07/09/2025    CO2 23 07/09/2025     07/09/2025    CALCIUM 8.8 07/09/2025    MG 1.6 07/09/2025    PHOS 3.3 07/09/2025    ALKPHOS 72 07/09/2025    ALT 29 07/09/2025    AST 47 (H) 07/09/2025    ALBUMIN 3.2 (L) 07/09/2025    HGBA1C 5.4 06/27/2025    TROPONINI <0.006 08/16/2023    BNP 38 08/15/2023     Recent Labs     07/08/25  2229 07/09/25  0444 07/09/25  1142 07/09/25  1756   WBC  --  6.40 7.87 10.19   HGB  --  6.1* 7.2* 7.7*   HCT  --  23.1* 26.2* 27.7*   PLT  --  230 231 263    138  --   --    K 3.8 4.0  --   --    CREATININE 0.9 0.9  --   --    * 107  --   --      No results for input(s): "PH", "PCO2", "PO2", "HCO3", "POCSATURATED", "BE" in the last 72 hours.   No LMP for male patient.    Please see Results Review for additional labs & imaging.     EKG:   Results for orders placed or performed during the hospital encounter of 07/08/25   EKG 12-lead    Collection Time: 07/09/25  7:25 AM   Result Value Ref Range    QRS Duration 86 ms    OHS QTC Calculation 443 " ms    Narrative    Test Reason : R00.0,    Vent. Rate : 102 BPM     Atrial Rate : 102 BPM     P-R Int : 194 ms          QRS Dur :  86 ms      QT Int : 340 ms       P-R-T Axes :  40  91   3 degrees    QTcB Int : 443 ms    Sinus tachycardia  Rightward axis  Nonspecific ST and/or T wave abnormalities  Abnormal ECG  When compared with ECG of 13-Jan-2025 20:28,  RI interval has decreased  Confirmed by Jeffy Loyola ([id number]) on 7/9/2025 8:35:14 AM    Referred By: AAAREFERRAL SELF           Confirmed By: Jeffy Loyola     ECHO & Other Cardiac Studies:  See subjective, if available.  ASSESSMENT/PLAN:       Pre-op Assessment    I have reviewed the Patient Summary Reports.     I have reviewed the Nursing Notes. I have reviewed the NPO Status.   I have reviewed the Medications.     Review of Systems  Anesthesia Hx:   History of prior surgery of interest to airway management or planning:          Denies Family Hx of Anesthesia complications.    Denies Personal Hx of Anesthesia complications.                    Hematology/Oncology:       -- Anemia:                                      Physical Exam  General: Well nourished, Cooperative, Alert and Oriented    Airway:  Mallampati: II   Tongue: Normal    Dental:  Periodontal disease      Anesthesia Plan  Type of Anesthesia, risks & benefits discussed:    Anesthesia Type: Gen ETT, MAC, Gen Natural Airway  Intra-op Monitoring Plan: Standard ASA Monitors  Post Op Pain Control Plan: multimodal analgesia and IV/PO Opioids PRN  Induction:  IV  Airway Plan: Direct and Video  Informed Consent: Informed consent signed with the Patient and all parties understand the risks and agree with anesthesia plan.  All questions answered. Patient consented to blood products? Yes  ASA Score: 2  Day of Surgery Review of History & Physical: H&P Update referred to the surgeon/provider.    Ready For Surgery From Anesthesia Perspective.     .

## 2025-07-09 NOTE — PLAN OF CARE
[facility] Dept  Initial Discharge Assessment       Primary Care Provider: Primo Culver MD    Admission Diagnosis: Anemia [D64.9]    Admission Date: 7/8/2025    Pt is independent with their ADL's and ambulation, does not require assistance. Post acute was discussed with pt. Pt d/c home with no needs at the moment. Pt spouse Andrews [phone] who's at bedside to provide transportation home.     Expected Discharge Date:     Transition of Care Barriers: (P) None    Payor: [employer] [facility] / Plan: BCBS ALL OUT OF STATE / Product Type: PPO /     Extended Emergency Contact Information  Primary Emergency Contact: DenytommyivanSweta   [place]  Mobile Phone: [phone]  Relation: Daughter  Secondary Emergency Contact: Andrews [place] [address] [place]  Mobile Phone: [phone]  Relation: Significant other    Discharge Plan A: (P) Home with family, Home  Discharge Plan B: (P) Home, Home with family      [id number] - [place], [place] - [address]  [address] [address]  Phone: [phone] Fax: [phone]    [id number] - [place], [place] - [address] [facility]  [address]  [facility] [address]  Phone: [phone] Fax: [phone]      Initial Assessment (most recent)       Adult Discharge Assessment - 07/09/25 1140          Discharge Assessment    Assessment Type Discharge Planning Assessment (P)      Confirmed/corrected address, phone number and insurance Yes (P)      Confirmed Demographics Correct on Facesheet (P)      Source of Information patient (P)      People in Home spouse (P)      Do you expect to return to your current living situation? Yes (P)      Do you have help at home or someone to help you manage your care at home? No (P)      Prior to hospitilization cognitive status: Alert/Oriented (P)      Current cognitive status: Alert/Oriented (P)      Walking or Climbing Stairs  Difficulty no (P)      Dressing/Bathing Difficulty no (P)      Home Accessibility stairs to enter home (P)      Number of Stairs, Main Entrance other (see comments) (P)    15.    Home Layout Able to live on 1st floor (P)      Equipment Currently Used at Home none (P)      Readmission within 30 days? No (P)      Patient currently being followed by outpatient case management? No (P)      Do you currently have service(s) that help you manage your care at home? No (P)      Do you take prescription medications? Yes (P)      Do you have prescription coverage? Yes (P)      Do you have any problems affording any of your prescribed medications? No (P)      Is the patient taking medications as prescribed? yes (P)      Who is going to help you get home at discharge? family/friend (P)      How do you get to doctors appointments? car, drives self (P)      Are you on dialysis? No (P)      Do you take coumadin? No (P)      Discharge Plan A Home with family;Home (P)      Discharge Plan B Home;Home with family (P)      DME Needed Upon Discharge  none (P)      Discharge Plan discussed with: Patient (P)      Transition of Care Barriers None (P)         Physical Activity    On average, how many days per week do you engage in moderate to strenuous exercise (like a brisk walk)? 0 days (P)      On average, how many minutes do you engage in exercise at this level? 0 min (P)         Financial Resource Strain    How hard is it for you to pay for the very basics like food, housing, medical care, and heating? Not very hard (P)         Housing Stability    In the last 12 months, was there a time when you were not able to pay the mortgage or rent on time? No (P)      At any time in the past 12 months, were you homeless or living in a shelter (including now)? No (P)         Transportation Needs    In the past 12 months, has lack of transportation kept you from medical appointments or from getting medications? No (P)      In the past 12 months,  has lack of transportation kept you from meetings, work, or from getting things needed for daily living? No (P)         Food Insecurity    Within the past 12 months, you worried that your food would run out before you got the money to buy more. Never true (P)      Within the past 12 months, the food you bought just didn't last and you didn't have money to get more. Never true (P)         Stress    Do you feel stress - tense, restless, nervous, or anxious, or unable to sleep at night because your mind is troubled all the time - these days? Not at all (P)         Social Isolation    How often do you feel lonely or isolated from those around you?  Never (P)         Alcohol Use    Q1: How often do you have a drink containing alcohol? 4 or more times a week (P)    drinks wine       [employer]    In the past 12 months has the electric, gas, oil, or water company threatened to shut off services in your home? No (P)         Health Literacy    How often do you need to have someone help you when you read instructions, pamphlets, or other written material from your doctor or pharmacy? Never (P)                       ADRIANA Ha, CSW.   Case Management  [facility]  Email: [email or web address]

## 2025-07-09 NOTE — CONSULTS
[facility]  Gastroenterology  Consult Note    Patient Name: Martín Thomason  MRN: [id number]  Admission Date: 7/8/2025  Hospital Length of Stay: 1 days  Code Status: Full Code   Attending Provider: Jessie Stevens MD   Consulting Provider: Tino Costa MD  Primary Care Physician: Primo Culver MD  Principal Problem:Anemia    Inpatient consult to Gastroenterology  Consult performed by: Tino Costa MD  Consult ordered by: Bruna Mon MD        Subjective:     HPI:  Martín Thomason is a 60yo M with a Hx of iron deficiency anemia and internal hemorrhoids who came to the emergency department with chief complaint of abnormal lab value. He had a hemoglobin of 5.6 that was drawn 11 days ago. His PCP told him to come to the hospital as he may need a blood transfusion. GI was consulted for potential colonoscopy inpatient since his Hgb has been dropping from 6.6 on admission to 6.1. When speaking with him today pt is feeling well. He's experienced no blood in his stool thus far and has never had any bleeding hx. Denies any n/v, fevers, or abd pain. Despite pt's low hemoglobin levels he's not experiencing any fatigue either and his appetite has been normal.     History reviewed. No pertinent past medical history.    Past Surgical History:   Procedure Laterality Date    COLONOSCOPY, SCREENING, LOW RISK PATIENT N/A 7/3/2025    Procedure: COLONOSCOPY, SCREENING, LOW RISK PATIENT;  Surgeon: Christelle Valencia MD;  Location: [facility] ENDOSCOPY;  Service: Endoscopy;  Laterality: N/A;  7/1 pre call complete [facility]    KNEE ARTHROPLASTY Right 09/1998       Review of patient's allergies indicates:  No Known Allergies  Family History    None       Tobacco Use    Smoking status: Never    Smokeless tobacco: Never   Substance and Sexual Activity    Alcohol use: Yes     Alcohol/week: 12.0 standard drinks of alcohol     Types: 10 Glasses of wine, 2 Shots of liquor per week    Drug use: Never    Sexual activity: Yes      Review of Systems   Constitutional: Negative.    HENT: Negative.     Eyes: Negative.    Respiratory: Negative.     Cardiovascular: Negative.    Gastrointestinal: Negative.    Endocrine: Negative.    Genitourinary: Negative.    Musculoskeletal: Negative.    Allergic/Immunologic: Negative.    Neurological: Negative.    Hematological: Negative.    Psychiatric/Behavioral: Negative.       Objective:     Vital Signs (Most Recent):  Temp: 99.2 °F (37.3 °C) (07/09/25 0710)  Pulse: 102 (07/09/25 0710)  Resp: 18 (07/09/25 0710)  BP: (!) 146/91 (07/09/25 0710)  SpO2: 96 % (07/09/25 0710) Vital Signs (24h Range):  Temp:  [97.5 °F (36.4 °C)-99.5 °F (37.5 °C)] 99.2 °F (37.3 °C)  Pulse:  [] 102  Resp:  [18-22] 18  SpO2:  [96 %-100 %] 96 %  BP: (108-156)/(60-91) 146/91     Weight: 115.7 kg (255 lb) (07/08/25 1640)  Body mass index is 34.58 kg/m².      Intake/Output Summary (Last 24 hours) at 7/9/2025 1048  Last data filed at 7/9/2025 0544  Gross per 24 hour   Intake 1002 ml   Output --   Net 1002 ml       Lines/Drains/Airways       Peripheral Intravenous Line  Duration             Peripheral IV Single Lumen 07/08/25 2039 18 G 1 in Anterior;Right Forearm <1 day    Peripheral IV Single Lumen 07/08/25 2039 20 G 1 in Left Antecubital <1 day                     Physical Exam  Constitutional:       Appearance: Normal appearance.   HENT:      Head: Normocephalic and atraumatic.   Eyes:      Extraocular Movements: Extraocular movements intact.      Pupils: Pupils are equal, round, and reactive to light.   Cardiovascular:      Rate and Rhythm: Normal rate and regular rhythm.   Pulmonary:      Effort: Pulmonary effort is normal.      Breath sounds: Normal breath sounds.   Abdominal:      General: Abdomen is flat. Bowel sounds are normal.      Palpations: Abdomen is soft.   Neurological:      General: No focal deficit present.      Mental Status: He is alert and oriented to person, place, and time.   Psychiatric:         Mood and  Affect: Mood normal.         Behavior: Behavior normal.         Thought Content: Thought content normal.         Judgment: Judgment normal.          Significant Labs:  CBC:   Recent Labs   Lab 07/08/25 2032 07/09/25 0444   WBC 6.22 6.40   HGB 6.6* 6.1*   HCT 25.5* 23.1*    230     BMP:   Recent Labs   Lab 07/09/25 0444         K 4.0      CO2 23   BUN 12   CREATININE 0.9   CALCIUM 8.8   MG 1.6     CMP:   Recent Labs   Lab 07/09/25 0444      CALCIUM 8.8   ALBUMIN 3.2*   PROT 7.2      K 4.0   CO2 23      BUN 12   CREATININE 0.9   ALKPHOS 72   ALT 29   AST 47*   BILITOT 0.9     Recent Lab Results  (Last 5 results in the past 24 hours)        07/09/25 0725 07/09/25 0444 07/08/25 2230 07/08/25 2229 07/08/25 2032        Unit Blood Type Code         5100  [P]                5100  [P]       Unit Expiration         [id number]  [P]                [id number]  [P]       ABO and RH       O POS         Albumin   3.2     3.5         ALP   72     77         ALT   29     30         Anion Gap   8     10         Aniso         Slight       Appearance, UA         Clear       AST   47     49         Baso #   0.06       0.08       Basophil %   0.9       1.3       Bilirubin (UA)         Negative       BILIRUBIN TOTAL   0.9  Comment: For infants and newborns, interpretation of results should be based   on gestational age, weight and in agreement with clinical   observations.    Premature Infant recommended reference ranges:   0-24 hours:  <8.0 mg/dL   24-48 hours: <12.0 mg/dL   3-5 days:    <15.0 mg/dL   6-29 days:   <15.0 mg/dL     0.6  Comment: For infants and newborns, interpretation of results should be based   on gestational age, weight and in agreement with clinical   observations.    Premature Infant recommended reference ranges:   0-24 hours:  <8.0 mg/dL   24-48 hours: <12.0 mg/dL   3-5 days:    <15.0 mg/dL   6-29 days:   <15.0 mg/dL         BUN   12     11          Calcium   8.8     8.5         Chloride   107     105         CO2   23     21         Color, UA         Yellow       Creatinine   0.9     0.9         Crossmatch Interpretation         Compatible  [P]                Compatible  [P]       DISPENSE STATUS         Issued  [P]                Issued  [P]       eGFR   >60  Comment: Estimated GFR calculated using the CKD-EPI creatinine (2021) equation.     >60  Comment: Estimated GFR calculated using the CKD-EPI creatinine (2021) equation.         Eos #   0.18       0.17       Eos %   2.8       2.7       Ferritin   10.0   12.0           Glucose   107     113         Glucose, UA         Negative       Gran # (ANC)   3.67       3.37       Group & Rh         O POS       Hematocrit   23.1       25.5       Hemoglobin   6.1       6.6       Hypo         Occasional       Immature Grans (Abs)   0.02  Comment: Mild elevation in immature granulocytes is non specific and can be seen in a variety of conditions including stress response, acute inflammation, trauma and pregnancy. Correlation with other laboratory and clinical findings is essential.       0.07  Comment: Mild elevation in immature granulocytes is non specific and can be seen in a variety of conditions including stress response, acute inflammation, trauma and pregnancy. Correlation with other laboratory and clinical findings is essential.       Immature Granulocytes   0.3       1.1       INDIRECT HILLARY         NEG       Iron     13           Iron Saturation     3           Ketones, UA         Negative       Leukocyte Esterase, UA         Negative       Lymph #   1.67       2.01       Lymph %   26.1       32.3       Magnesium    1.6             MCH   18.8       18.1       MCHC   26.4       25.9       MCV   71       70       Mono #   0.80       0.52       Mono %   12.5       8.4       MPV   9.6       9.7       Neut %   57.4       54.2       NITRITE UA         Negative       nRBC   1       1       Blood, UA         Negative        QRS Duration 86               OHS QTC Calculation 443               Ovalocytes         Occasional       pH, UA         6.0       Phosphorus Level   3.3             Platelet Estimate         Appears Normal       Platelet Count   230       298       Poikilocytosis         Slight       Poly         Occasional       Potassium   4.0     3.8         Product Code         [id number]  [P]                [id number]  [P]       PROTEIN TOTAL   7.2     7.8         Protein, UA         Negative  Comment: Recommend a 24 hour urine protein or a urine protein/creatinine ratio if globulin induced proteinuria is clinically suspected.       RBC   3.24       3.64       RDW   25.4       25.5       Retic         4.4       Sodium   138     136         Spec Grav UA         1.020       Specimen Outdate         07/11/2025 23:59       TIBC     471           Transferrin     318           Unit ABO/Rh         O POS  [P]                O POS  [P]       UNIT NUMBER         [id number]  [P]                [id number]  [P]       Urobilinogen, UA         Negative       WBC   6.40       6.22                               [P] - Preliminary Result               Significant Imaging:  Imaging results within the past 24 hours have been reviewed.  Assessment/Plan:     GI  * Anemia  Martín Thomason is a 60yo M with a Hx of iron deficiency anemia and internal hemorrhoids who came to the emergency department with chief complaint of abnormal lab value (Hgb of 5.6). GI was consulted for potential colonoscopy inpatient since his Hgb has been dropping from 6.6 on admission to 6.1 after blood trasfusion. Pt overall is doing okay.. denies any serious fatigue symptoms or blood in stool.       Recommendation:  - In the setting of pt's anemia we will plan for inpatient colonoscopy and EGD tomorrow (7/10)  - Clear liquid diet   - NPO at midnight   - Golytely prep to start at 6pm, to be completed within 4 hours if possible  - Monitor Hgb q8hrs  - Transfuse to keep Hgb  >7, plts >50  - Hold anticoagulants if safe to do so per primary team  - If becomes hemodynamically unstable with associated large volume hematochezia, recommend STAT CTA and IR embolization if positive         Thank you for your consult. I will follow-up with patient. Please contact us if you have any additional questions.    Tino Costa MD  Gastroenterology  [facility]

## 2025-07-09 NOTE — ASSESSMENT & PLAN NOTE
Anemia is likely due to Iron deficiency and possibly from occult LGIB. Most recent hemoglobin and hematocrit are listed below.  Recent Labs     07/08/25 2032   HGB 6.6*   HCT 25.5*     Plan  - Monitor serial CBC: Every 8 hours  - Transfuse PRBC if patient becomes hemodynamically unstable, symptomatic or H/H drops below 7/21.  - Patient has received 2 units of PRBCs on 7/8  - Patient's anemia is currently stable

## 2025-07-09 NOTE — H&P
[facility] Dep\[facility] [place] [facility]\"" Medicine  History & Physical    Patient Name: Martín Thomason  MRN: [id number]  Patient Class: IP- Inpatient  Admission Date: 7/8/2025  Attending Physician: Jessie Stevens MD   Primary Care Provider: Primo Culver MD         Patient information was obtained from patient and ER records.     Subjective:     Principal Problem:Anemia    Chief Complaint:   Chief Complaint   Patient presents with    Abnormal Lab     Hgb 5.6,  brown stool        HPI: 59-year-old male presents to [facility] under the instruction of his PCP for blood transfusions secondary to anemia (Hgb < 7). He had a hemoglobin of 5.6 that was drawn 11 days ago, he says at this time he felt short of breath and very tired. He opted to take iron supplements and eat iron heavy foods. He reports to me that he has a history of internal hemorrhoids for which he noticed bright red blood in his stools about 3 weeks prior. He applied a rectal cream which he says helped alleviate his dyschezia and hematochezia. He denies any further episodes since. States that he has had several colonoscopies previously has never had any issues. He has never required a blood transfusion. He denies lightheadedness, chest pain, shortness of breath, fatigue. Of note, the patient has a colonoscopy scheduled for 7/10.     In the ED he presented with VSS, Hgb 6.6, al other lab work and imaging un-remarkable.     History reviewed. No pertinent past medical history.    Past Surgical History:   Procedure Laterality Date    COLONOSCOPY, SCREENING, LOW RISK PATIENT N/A 7/3/2025    Procedure: COLONOSCOPY, SCREENING, LOW RISK PATIENT;  Surgeon: Christelle Valencia MD;  Location: [facility] ENDOSCOPY;  Service: Endoscopy;  Laterality: N/A;  7/1 pre call complete [facility]    KNEE ARTHROPLASTY Right 09/1998       Review of patient's allergies indicates:  No Known Allergies    No current facility-administered medications on file prior to encounter.     Current Outpatient  Medications on File Prior to Encounter   Medication Sig    hydroCHLOROthiazide (HYDRODIURIL) 12.5 MG Tab Take 12.5 mg by mouth once daily.    losartan (COZAAR) 100 MG tablet Take 100 mg by mouth once daily.     Family History    None       Tobacco Use    Smoking status: Never    Smokeless tobacco: Never   Substance and Sexual Activity    Alcohol use: Yes     Alcohol/week: 12.0 standard drinks of alcohol     Types: 10 Glasses of wine, 2 Shots of liquor per week    Drug use: Never    Sexual activity: Yes     Review of Systems   All other systems reviewed and are negative.    Objective:     Vital Signs (Most Recent):  Temp: 98.6 °F (37 °C) (07/08/25 2314)  Pulse: 104 (07/08/25 2314)  Resp: 18 (07/08/25 1640)  BP: (!) 149/84 (07/08/25 2314)  SpO2: 99 % (07/08/25 2314) Vital Signs (24h Range):  Temp:  [97.5 °F (36.4 °C)-98.6 °F (37 °C)] 98.6 °F (37 °C)  Pulse:  [104-109] 104  Resp:  [18] 18  SpO2:  [99 %-100 %] 99 %  BP: (108-149)/(60-84) 149/84     Weight: 115.7 kg (255 lb)  Body mass index is 34.58 kg/m².     Physical Exam  Vitals and nursing note reviewed.   Constitutional:       General: He is not in acute distress.     Appearance: Normal appearance. He is not ill-appearing.   Eyes:      General: No scleral icterus.  Cardiovascular:      Rate and Rhythm: Normal rate and regular rhythm.      Heart sounds: No murmur heard.     No friction rub. No gallop.   Pulmonary:      Effort: Pulmonary effort is normal.      Breath sounds: Normal breath sounds. No wheezing, rhonchi or rales.   Abdominal:      Tenderness: There is no abdominal tenderness. There is no guarding.      Hernia: A hernia is present.   Musculoskeletal:      Right lower leg: No edema.      Left lower leg: No edema.   Neurological:      General: No focal deficit present.      Mental Status: He is alert.                Significant Labs: All pertinent labs within the past 24 hours have been reviewed.  CBC:   Recent Labs   Lab 07/08/25 2032   WBC 6.22   HGB  6.6*   HCT 25.5*        CMP:   Recent Labs   Lab 07/08/25  2229      K 3.8      CO2 21*   *   BUN 11   CREATININE 0.9   CALCIUM 8.5*   PROT 7.8   ALBUMIN 3.5   BILITOT 0.6   ALKPHOS 77   AST 49*   ALT 30   ANIONGAP 10       Significant Imaging: I have reviewed all pertinent imaging results/findings within the past 24 hours.  Assessment/Plan:     Assessment & Plan  Anemia  Anemia is likely due to Iron deficiency. Most recent hemoglobin and hematocrit are listed below.  Recent Labs     07/08/25 2032   HGB 6.6*   HCT 25.5*     Plan  - Monitor serial CBC: Every 8 hours  - Transfuse PRBC if patient becomes hemodynamically unstable, symptomatic or H/H drops below 7/21.  - Patient has received 2 units of PRBCs on 7/8  - Patient's anemia is currently stable  - Repeat Iron studies on    - Ferritin 12.0   - TIBC 471   - Fe 13  - Patient with colonoscopy scheduled on 7/10  - Will defer GI consultation during admission as patient is HDS with no evidence of active bleeding. GI consult reasonable should patient H/H continue to decline despite appropriate transfusion and/or patient becomes hemodynamically unstable.   Abnormal liver enzymes  Noted, no indication for investigation at this time    Orthostasis  Patient with history of orthostasis, however no signs/symptoms of this inpatient. CTM and low threshold for repeating orthostatics should suspicion arise.       Iron deficiency anemia  Anemia is likely due to Iron deficiency and possibly from occult LGIB. Most recent hemoglobin and hematocrit are listed below.  Recent Labs     07/08/25 2032   HGB 6.6*   HCT 25.5*     Plan  - Monitor serial CBC: Every 8 hours  - Transfuse PRBC if patient becomes hemodynamically unstable, symptomatic or H/H drops below 7/21.  - Patient has received 2 units of PRBCs on 7/8  - Patient's anemia is currently stable  VTE Risk Mitigation (From admission, onward)           Ordered     IP VTE HIGH RISK PATIENT  Once          07/08/25 2242     Place sequential compression device  Until discontinued         07/08/25 2242     Reason for No Pharmacological VTE Prophylaxis  Once        Question:  Reasons:  Answer:  Active Bleeding    07/08/25 2242                                                Jose Manuel Mejia MD  Department of Hospital Medicine  [facility] - Emergency Dept

## 2025-07-10 ENCOUNTER — ANESTHESIA (OUTPATIENT)
Dept: ENDOSCOPY | Facility: HOSPITAL | Age: 60
End: 2025-07-10
Payer: COMMERCIAL

## 2025-07-10 ENCOUNTER — PATIENT MESSAGE (OUTPATIENT)
Dept: PRIMARY CARE CLINIC | Facility: CLINIC | Age: 60
End: 2025-07-10
Payer: COMMERCIAL

## 2025-07-10 PROBLEM — E87.1 HYPONATREMIA: Status: ACTIVE | Noted: 2025-07-10

## 2025-07-10 PROBLEM — K63.89 COLONIC MASS: Status: ACTIVE | Noted: 2025-07-10

## 2025-07-10 LAB
ABSOLUTE EOSINOPHIL (OHS): 0.03 K/UL
ABSOLUTE EOSINOPHIL (OHS): 0.08 K/UL
ABSOLUTE MONOCYTE (OHS): 1 K/UL (ref 0.3–1)
ABSOLUTE MONOCYTE (OHS): 1.09 K/UL (ref 0.3–1)
ABSOLUTE NEUTROPHIL COUNT (OHS): 5.34 K/UL (ref 1.8–7.7)
ABSOLUTE NEUTROPHIL COUNT (OHS): 6.56 K/UL (ref 1.8–7.7)
ALBUMIN SERPL BCP-MCNC: 3.5 G/DL (ref 3.5–5.2)
ALP SERPL-CCNC: 82 UNIT/L (ref 40–150)
ALT SERPL W/O P-5'-P-CCNC: 24 UNIT/L (ref 10–44)
ANION GAP (OHS): 11 MMOL/L (ref 8–16)
AST SERPL-CCNC: 34 UNIT/L (ref 11–45)
BASOPHILS # BLD AUTO: 0.06 K/UL
BASOPHILS # BLD AUTO: 0.08 K/UL
BASOPHILS NFR BLD AUTO: 0.7 %
BASOPHILS NFR BLD AUTO: 0.8 %
BILIRUB SERPL-MCNC: 1.6 MG/DL (ref 0.1–1)
BUN SERPL-MCNC: 9 MG/DL (ref 8–23)
CALCIUM SERPL-MCNC: 9 MG/DL (ref 8.7–10.5)
CARCINOEMBRYONIC ANTIGEN (OHS): 8.4 NG/ML
CHLORIDE SERPL-SCNC: 100 MMOL/L (ref 95–110)
CO2 SERPL-SCNC: 22 MMOL/L (ref 23–29)
CREAT SERPL-MCNC: 0.9 MG/DL (ref 0.5–1.4)
ERYTHROCYTE [DISTWIDTH] IN BLOOD BY AUTOMATED COUNT: 25.4 % (ref 11.5–14.5)
ERYTHROCYTE [DISTWIDTH] IN BLOOD BY AUTOMATED COUNT: 25.6 % (ref 11.5–14.5)
GFR SERPLBLD CREATININE-BSD FMLA CKD-EPI: >60 ML/MIN/1.73/M2
GLUCOSE SERPL-MCNC: 110 MG/DL (ref 70–110)
HCT VFR BLD AUTO: 26.2 % (ref 40–54)
HCT VFR BLD AUTO: 26.8 % (ref 40–54)
HGB BLD-MCNC: 7.3 GM/DL (ref 14–18)
HGB BLD-MCNC: 7.4 GM/DL (ref 14–18)
HGB BLD-MCNC: 7.4 GM/DL (ref 14–18)
IMM GRANULOCYTES # BLD AUTO: 0.03 K/UL (ref 0–0.04)
IMM GRANULOCYTES # BLD AUTO: 0.03 K/UL (ref 0–0.04)
IMM GRANULOCYTES NFR BLD AUTO: 0.3 % (ref 0–0.5)
IMM GRANULOCYTES NFR BLD AUTO: 0.4 % (ref 0–0.5)
LYMPHOCYTES # BLD AUTO: 1.58 K/UL (ref 1–4.8)
LYMPHOCYTES # BLD AUTO: 1.63 K/UL (ref 1–4.8)
MAGNESIUM SERPL-MCNC: 1.7 MG/DL (ref 1.6–2.6)
MCH RBC QN AUTO: 19.9 PG (ref 27–31)
MCH RBC QN AUTO: 20.2 PG (ref 27–31)
MCHC RBC AUTO-ENTMCNC: 27.6 G/DL (ref 32–36)
MCHC RBC AUTO-ENTMCNC: 27.9 G/DL (ref 32–36)
MCV RBC AUTO: 72 FL (ref 82–98)
MCV RBC AUTO: 72 FL (ref 82–98)
NUCLEATED RBC (/100WBC) (OHS): 1 /100 WBC
NUCLEATED RBC (/100WBC) (OHS): 1 /100 WBC
PHOSPHATE SERPL-MCNC: 2.7 MG/DL (ref 2.7–4.5)
PLATELET # BLD AUTO: 271 K/UL (ref 150–450)
PLATELET # BLD AUTO: 277 K/UL (ref 150–450)
PMV BLD AUTO: 10 FL (ref 9.2–12.9)
PMV BLD AUTO: 9.8 FL (ref 9.2–12.9)
POTASSIUM SERPL-SCNC: 3.6 MMOL/L (ref 3.5–5.1)
PROT SERPL-MCNC: 8.2 GM/DL (ref 6–8.4)
RBC # BLD AUTO: 3.62 M/UL (ref 4.6–6.2)
RBC # BLD AUTO: 3.72 M/UL (ref 4.6–6.2)
RELATIVE EOSINOPHIL (OHS): 0.3 %
RELATIVE EOSINOPHIL (OHS): 1 %
RELATIVE LYMPHOCYTE (OHS): 17.3 % (ref 18–48)
RELATIVE LYMPHOCYTE (OHS): 19.5 % (ref 18–48)
RELATIVE MONOCYTE (OHS): 11.6 % (ref 4–15)
RELATIVE MONOCYTE (OHS): 12.4 % (ref 4–15)
RELATIVE NEUTROPHIL (OHS): 66 % (ref 38–73)
RELATIVE NEUTROPHIL (OHS): 69.7 % (ref 38–73)
SODIUM SERPL-SCNC: 133 MMOL/L (ref 136–145)
WBC # BLD AUTO: 8.09 K/UL (ref 3.9–12.7)
WBC # BLD AUTO: 9.42 K/UL (ref 3.9–12.7)

## 2025-07-10 PROCEDURE — 88342 IMHCHEM/IMCYTCHM 1ST ANTB: CPT | Mod: 26,,, | Performed by: PATHOLOGY

## 2025-07-10 PROCEDURE — 82378 CARCINOEMBRYONIC ANTIGEN: CPT

## 2025-07-10 PROCEDURE — 85025 COMPLETE CBC W/AUTO DIFF WBC: CPT | Performed by: STUDENT IN AN ORGANIZED HEALTH CARE EDUCATION/TRAINING PROGRAM

## 2025-07-10 PROCEDURE — 85018 HEMOGLOBIN: CPT | Performed by: STUDENT IN AN ORGANIZED HEALTH CARE EDUCATION/TRAINING PROGRAM

## 2025-07-10 PROCEDURE — 45381 COLONOSCOPY SUBMUCOUS NJX: CPT | Performed by: STUDENT IN AN ORGANIZED HEALTH CARE EDUCATION/TRAINING PROGRAM

## 2025-07-10 PROCEDURE — 45380 COLONOSCOPY AND BIOPSY: CPT | Mod: XS,,, | Performed by: STUDENT IN AN ORGANIZED HEALTH CARE EDUCATION/TRAINING PROGRAM

## 2025-07-10 PROCEDURE — 63600175 PHARM REV CODE 636 W HCPCS: Performed by: NURSE ANESTHETIST, CERTIFIED REGISTERED

## 2025-07-10 PROCEDURE — 36415 COLL VENOUS BLD VENIPUNCTURE: CPT

## 2025-07-10 PROCEDURE — 88305 TISSUE EXAM BY PATHOLOGIST: CPT | Mod: 26,,, | Performed by: PATHOLOGY

## 2025-07-10 PROCEDURE — 45381 COLONOSCOPY SUBMUCOUS NJX: CPT | Mod: 51,,, | Performed by: STUDENT IN AN ORGANIZED HEALTH CARE EDUCATION/TRAINING PROGRAM

## 2025-07-10 PROCEDURE — 37000008 HC ANESTHESIA 1ST 15 MINUTES: Performed by: STUDENT IN AN ORGANIZED HEALTH CARE EDUCATION/TRAINING PROGRAM

## 2025-07-10 PROCEDURE — 45388 COLONOSCOPY W/ABLATION: CPT | Performed by: STUDENT IN AN ORGANIZED HEALTH CARE EDUCATION/TRAINING PROGRAM

## 2025-07-10 PROCEDURE — 45380 COLONOSCOPY AND BIOPSY: CPT | Mod: XS | Performed by: STUDENT IN AN ORGANIZED HEALTH CARE EDUCATION/TRAINING PROGRAM

## 2025-07-10 PROCEDURE — 80053 COMPREHEN METABOLIC PANEL: CPT

## 2025-07-10 PROCEDURE — 99223 1ST HOSP IP/OBS HIGH 75: CPT | Mod: ,,, | Performed by: COLON & RECTAL SURGERY

## 2025-07-10 PROCEDURE — 88341 IMHCHEM/IMCYTCHM EA ADD ANTB: CPT | Mod: 26,,, | Performed by: PATHOLOGY

## 2025-07-10 PROCEDURE — 83735 ASSAY OF MAGNESIUM: CPT

## 2025-07-10 PROCEDURE — 27201028 HC NEEDLE, SCLERO: Performed by: STUDENT IN AN ORGANIZED HEALTH CARE EDUCATION/TRAINING PROGRAM

## 2025-07-10 PROCEDURE — 63600175 PHARM REV CODE 636 W HCPCS: Mod: JZ,TB

## 2025-07-10 PROCEDURE — 27202363 HC INJECTION AGENT, SUBMUCOSAL, ANY: Performed by: STUDENT IN AN ORGANIZED HEALTH CARE EDUCATION/TRAINING PROGRAM

## 2025-07-10 PROCEDURE — 45388 COLONOSCOPY W/ABLATION: CPT | Mod: ,,, | Performed by: STUDENT IN AN ORGANIZED HEALTH CARE EDUCATION/TRAINING PROGRAM

## 2025-07-10 PROCEDURE — 36415 COLL VENOUS BLD VENIPUNCTURE: CPT | Performed by: STUDENT IN AN ORGANIZED HEALTH CARE EDUCATION/TRAINING PROGRAM

## 2025-07-10 PROCEDURE — 0DBK8ZX EXCISION OF ASCENDING COLON, VIA NATURAL OR ARTIFICIAL OPENING ENDOSCOPIC, DIAGNOSTIC: ICD-10-PCS | Performed by: STUDENT IN AN ORGANIZED HEALTH CARE EDUCATION/TRAINING PROGRAM

## 2025-07-10 PROCEDURE — 43270 EGD LESION ABLATION: CPT | Mod: ,,, | Performed by: STUDENT IN AN ORGANIZED HEALTH CARE EDUCATION/TRAINING PROGRAM

## 2025-07-10 PROCEDURE — 84100 ASSAY OF PHOSPHORUS: CPT

## 2025-07-10 PROCEDURE — 43270 EGD LESION ABLATION: CPT | Performed by: STUDENT IN AN ORGANIZED HEALTH CARE EDUCATION/TRAINING PROGRAM

## 2025-07-10 PROCEDURE — 25000003 PHARM REV CODE 250: Performed by: NURSE PRACTITIONER

## 2025-07-10 PROCEDURE — 37000009 HC ANESTHESIA EA ADD 15 MINS: Performed by: STUDENT IN AN ORGANIZED HEALTH CARE EDUCATION/TRAINING PROGRAM

## 2025-07-10 PROCEDURE — 21400001 HC TELEMETRY ROOM

## 2025-07-10 PROCEDURE — 0W3P8ZZ CONTROL BLEEDING IN GASTROINTESTINAL TRACT, VIA NATURAL OR ARTIFICIAL OPENING ENDOSCOPIC: ICD-10-PCS | Performed by: STUDENT IN AN ORGANIZED HEALTH CARE EDUCATION/TRAINING PROGRAM

## 2025-07-10 PROCEDURE — 88341 IMHCHEM/IMCYTCHM EA ADD ANTB: CPT | Mod: TC | Performed by: STUDENT IN AN ORGANIZED HEALTH CARE EDUCATION/TRAINING PROGRAM

## 2025-07-10 PROCEDURE — 93005 ELECTROCARDIOGRAM TRACING: CPT

## 2025-07-10 PROCEDURE — 27201012 HC FORCEPS, HOT/COLD, DISP: Performed by: STUDENT IN AN ORGANIZED HEALTH CARE EDUCATION/TRAINING PROGRAM

## 2025-07-10 PROCEDURE — 25500020 PHARM REV CODE 255: Performed by: STUDENT IN AN ORGANIZED HEALTH CARE EDUCATION/TRAINING PROGRAM

## 2025-07-10 PROCEDURE — 25000003 PHARM REV CODE 250: Performed by: NURSE ANESTHETIST, CERTIFIED REGISTERED

## 2025-07-10 PROCEDURE — 93010 ELECTROCARDIOGRAM REPORT: CPT | Mod: ,,, | Performed by: INTERNAL MEDICINE

## 2025-07-10 RX ORDER — HALOPERIDOL LACTATE 5 MG/ML
0.5 INJECTION, SOLUTION INTRAMUSCULAR EVERY 10 MIN PRN
Status: DISCONTINUED | OUTPATIENT
Start: 2025-07-10 | End: 2025-07-10 | Stop reason: HOSPADM

## 2025-07-10 RX ORDER — PROPOFOL 10 MG/ML
VIAL (ML) INTRAVENOUS
Status: DISCONTINUED | OUTPATIENT
Start: 2025-07-10 | End: 2025-07-10

## 2025-07-10 RX ORDER — HYDROMORPHONE HYDROCHLORIDE 2 MG/ML
0.2 INJECTION, SOLUTION INTRAMUSCULAR; INTRAVENOUS; SUBCUTANEOUS EVERY 5 MIN PRN
Status: DISCONTINUED | OUTPATIENT
Start: 2025-07-10 | End: 2025-07-10 | Stop reason: HOSPADM

## 2025-07-10 RX ORDER — GLUCAGON 1 MG
1 KIT INJECTION
Status: DISCONTINUED | OUTPATIENT
Start: 2025-07-10 | End: 2025-07-10 | Stop reason: HOSPADM

## 2025-07-10 RX ORDER — DEXAMETHASONE SODIUM PHOSPHATE 4 MG/ML
INJECTION, SOLUTION INTRA-ARTICULAR; INTRALESIONAL; INTRAMUSCULAR; INTRAVENOUS; SOFT TISSUE
Status: DISCONTINUED | OUTPATIENT
Start: 2025-07-10 | End: 2025-07-10

## 2025-07-10 RX ORDER — PHENYLEPHRINE HYDROCHLORIDE 10 MG/ML
INJECTION INTRAVENOUS
Status: DISCONTINUED | OUTPATIENT
Start: 2025-07-10 | End: 2025-07-10

## 2025-07-10 RX ORDER — DEXMEDETOMIDINE HYDROCHLORIDE 100 UG/ML
INJECTION, SOLUTION INTRAVENOUS
Status: DISCONTINUED | OUTPATIENT
Start: 2025-07-10 | End: 2025-07-10

## 2025-07-10 RX ORDER — LIDOCAINE HYDROCHLORIDE 20 MG/ML
INJECTION INTRAVENOUS
Status: DISCONTINUED | OUTPATIENT
Start: 2025-07-10 | End: 2025-07-10

## 2025-07-10 RX ORDER — ROCURONIUM BROMIDE 10 MG/ML
INJECTION, SOLUTION INTRAVENOUS
Status: DISCONTINUED | OUTPATIENT
Start: 2025-07-10 | End: 2025-07-10

## 2025-07-10 RX ORDER — MIDAZOLAM HYDROCHLORIDE 1 MG/ML
INJECTION INTRAMUSCULAR; INTRAVENOUS
Status: DISCONTINUED | OUTPATIENT
Start: 2025-07-10 | End: 2025-07-10

## 2025-07-10 RX ORDER — ONDANSETRON HYDROCHLORIDE 2 MG/ML
INJECTION, SOLUTION INTRAVENOUS
Status: DISCONTINUED | OUTPATIENT
Start: 2025-07-10 | End: 2025-07-10

## 2025-07-10 RX ORDER — POTASSIUM CHLORIDE 750 MG/1
10 CAPSULE, EXTENDED RELEASE ORAL ONCE
Status: DISCONTINUED | OUTPATIENT
Start: 2025-07-10 | End: 2025-07-11

## 2025-07-10 RX ORDER — PANTOPRAZOLE SODIUM 40 MG/10ML
40 INJECTION, POWDER, LYOPHILIZED, FOR SOLUTION INTRAVENOUS 2 TIMES DAILY
Status: DISCONTINUED | OUTPATIENT
Start: 2025-07-10 | End: 2025-07-10

## 2025-07-10 RX ORDER — FENTANYL CITRATE 50 UG/ML
INJECTION, SOLUTION INTRAMUSCULAR; INTRAVENOUS
Status: DISCONTINUED | OUTPATIENT
Start: 2025-07-10 | End: 2025-07-10

## 2025-07-10 RX ORDER — MUPIROCIN 20 MG/G
OINTMENT TOPICAL 2 TIMES DAILY
Status: DISCONTINUED | OUTPATIENT
Start: 2025-07-10 | End: 2025-07-11 | Stop reason: HOSPADM

## 2025-07-10 RX ORDER — SUCCINYLCHOLINE CHLORIDE 20 MG/ML
INJECTION INTRAMUSCULAR; INTRAVENOUS
Status: DISCONTINUED | OUTPATIENT
Start: 2025-07-10 | End: 2025-07-10

## 2025-07-10 RX ADMIN — IOHEXOL 100 ML: 350 INJECTION, SOLUTION INTRAVENOUS at 05:07

## 2025-07-10 RX ADMIN — PROPOFOL 80 MG: 10 INJECTION, EMULSION INTRAVENOUS at 11:07

## 2025-07-10 RX ADMIN — GLYCOPYRROLATE 0.1 MG: 0.2 INJECTION, SOLUTION INTRAMUSCULAR; INTRAVENOUS at 11:07

## 2025-07-10 RX ADMIN — PANTOPRAZOLE SODIUM 40 MG: 40 INJECTION, POWDER, LYOPHILIZED, FOR SOLUTION INTRAVENOUS at 10:07

## 2025-07-10 RX ADMIN — SODIUM CHLORIDE: 0.9 INJECTION, SOLUTION INTRAVENOUS at 10:07

## 2025-07-10 RX ADMIN — PROPOFOL 200 MCG/KG/MIN: 10 INJECTION, EMULSION INTRAVENOUS at 11:07

## 2025-07-10 RX ADMIN — ROCURONIUM BROMIDE 50 MG: 10 INJECTION INTRAVENOUS at 11:07

## 2025-07-10 RX ADMIN — SUGAMMADEX 400 MG: 100 INJECTION, SOLUTION INTRAVENOUS at 12:07

## 2025-07-10 RX ADMIN — FENTANYL CITRATE 50 MCG: 50 INJECTION, SOLUTION INTRAMUSCULAR; INTRAVENOUS at 12:07

## 2025-07-10 RX ADMIN — FERUMOXYTOL 510 MG: 510 INJECTION INTRAVENOUS at 02:07

## 2025-07-10 RX ADMIN — MIDAZOLAM HYDROCHLORIDE 2 MG: 2 INJECTION, SOLUTION INTRAMUSCULAR; INTRAVENOUS at 11:07

## 2025-07-10 RX ADMIN — DEXMEDETOMIDINE 4 MCG: 100 INJECTION, SOLUTION, CONCENTRATE INTRAVENOUS at 11:07

## 2025-07-10 RX ADMIN — DEXAMETHASONE SODIUM PHOSPHATE 4 MG: 4 INJECTION, SOLUTION INTRAMUSCULAR; INTRAVENOUS at 11:07

## 2025-07-10 RX ADMIN — PROPOFOL 30 MG: 10 INJECTION, EMULSION INTRAVENOUS at 11:07

## 2025-07-10 RX ADMIN — DEXMEDETOMIDINE 8 MCG: 100 INJECTION, SOLUTION, CONCENTRATE INTRAVENOUS at 11:07

## 2025-07-10 RX ADMIN — MUPIROCIN: 20 OINTMENT TOPICAL at 09:07

## 2025-07-10 RX ADMIN — SUCCINYLCHOLINE CHLORIDE 200 MG: 20 INJECTION, SOLUTION INTRAMUSCULAR; INTRAVENOUS at 11:07

## 2025-07-10 RX ADMIN — ONDANSETRON 4 MG: 2 INJECTION INTRAMUSCULAR; INTRAVENOUS at 11:07

## 2025-07-10 RX ADMIN — LIDOCAINE HYDROCHLORIDE 100 MG: 20 INJECTION INTRAVENOUS at 11:07

## 2025-07-10 RX ADMIN — PHENYLEPHRINE HYDROCHLORIDE 100 MCG: 10 INJECTION INTRAVENOUS at 11:07

## 2025-07-10 RX ADMIN — SODIUM CHLORIDE, POTASSIUM CHLORIDE, SODIUM LACTATE AND CALCIUM CHLORIDE 500 ML: 600; 310; 30; 20 INJECTION, SOLUTION INTRAVENOUS at 01:07

## 2025-07-10 RX ADMIN — DEXMEDETOMIDINE 12 MCG: 100 INJECTION, SOLUTION, CONCENTRATE INTRAVENOUS at 11:07

## 2025-07-10 RX ADMIN — FENTANYL CITRATE 50 MCG: 50 INJECTION, SOLUTION INTRAMUSCULAR; INTRAVENOUS at 11:07

## 2025-07-10 RX ADMIN — PROPOFOL 20 MG: 10 INJECTION, EMULSION INTRAVENOUS at 11:07

## 2025-07-10 NOTE — ANESTHESIA PROCEDURE NOTES
Intubation    Date/Time: 7/10/2025 11:13 AM    Performed by: Denise Lal CRNA  Authorized by: Naga Dorado MD    Intubation:     Induction:  Rapid sequence induction    Intubated:  Postinduction    Mask Ventilation:  Not attempted    Attempts:  1    Attempted By:  Staff anesthesiologist    Method of Intubation:  Video laryngoscopy    Blade:  Ohara 3    Laryngeal View Grade: Grade I - full view of cords      Difficult Airway Encountered?: No      Complications:  None    Airway Device:  Oral endotracheal tube    Airway Device Size:  7.5    Style/Cuff Inflation:  Cuffed (inflated to minimal occlusive pressure)    Tube secured:  24    Secured at:  The lips    Placement Verified By:  Capnometry    Complicating Factors:  Obesity    Findings Post-Intubation:  BS equal bilateral and atraumatic/condition of teeth unchanged

## 2025-07-10 NOTE — PROVATION PATIENT INSTRUCTIONS
Discharge Summary/Instructions after an Endoscopic Procedure  Patient Name: Martín Thomason  Patient MRN: [id number]  Patient YOB: 1965  Thursday, July 10, 2025  Yamil Amor MD  Dear patient,  As a result of recent federal legislation (The Federal Cures Act), you may   receive lab or pathology results from your procedure in your [facility]   account before your physician is able to contact you. Your physician or   their representative will relay the results to you with their   recommendations at their soonest availability.  Thank you,  RESTRICTIONS:  During your procedure today, you received medications for sedation.  These   medications may affect your judgment, balance and coordination.  Therefore,   for 24 hours, you have the following restrictions:   - DO NOT drive a car, operate machinery, make legal/financial decisions,   sign important papers or drink alcohol.    ACTIVITY:  Today: no heavy lifting, straining or running due to procedural   sedation/anesthesia.  The following day: return to full activity including work.  DIET:  Eat and drink normally unless instructed otherwise.     TREATMENT FOR COMMON SIDE EFFECTS:  - Mild abdominal pain, nausea, belching, bloating or excessive gas:  rest,   eat lightly and use a heating pad.  - Sore Throat: treat with throat lozenges and/or gargle with warm salt   water.  - Because air was used during the procedure, expelling large amounts of air   from your rectum or belching is normal.  - If a bowel prep was taken, you may not have a bowel movement for 1-3 days.    This is normal.  SYMPTOMS TO WATCH FOR AND REPORT TO YOUR PHYSICIAN:  1. Abdominal pain or bloating, other than gas cramps.  2. Chest pain.  3. Back pain.  4. Signs of infection such as: chills or fever occurring within 24 hours   after the procedure.  5. Rectal bleeding, which would show as bright red, maroon, or black stools.   (A tablespoon of blood from the rectum is not serious, especially  if   hemorrhoids are present.)  6. Vomiting.  7. Weakness or dizziness.  GO DIRECTLY TO THE NEAREST EMERGENCY ROOM IF YOU HAVE ANY OF THE FOLLOWING:      Difficulty breathing              Chills and/or fever over 101 F   Persistent vomiting and/or vomiting blood   Severe abdominal pain   Severe chest pain   Black, tarry stools   Bleeding- more than one tablespoon   Any other symptom or condition that you feel may need urgent attention  Your doctor recommends these additional instructions:  If any biopsies were taken, your doctors clinic will contact you in 1 to 2   weeks with any results.  - Return patient to [facility] [place] for ongoing care.   - Resume previous diet.   - Continue present medications.   - Await pathology results.   - The findings and recommendations were discussed with the patient.   - The findings and recommendations were discussed with the patient's family.     - The findings and recommendations were discussed with the referring   physician.   - Recommend discussion with colorectal surgery regarding this malignant mass   in ascending colon.  - Repeat colonoscopy in 1 year for surveillance.  For questions, problems or results please call your physician - Yamil Amor MD at Work:  [phone].  [facility], EMERGENCY ROOM PHONE NUMBER: [phone]  IF A COMPLICATION OR EMERGENCY SITUATION ARISES AND YOU ARE UNABLE TO REACH   YOUR PHYSICIAN - GO DIRECTLY TO THE EMERGENCY ROOM.  Yamil Amor MD  7/10/2025 12:26:38 PM  This report has been verified and signed electronically.  Dear patient,  As a result of recent federal legislation (The Federal Cures Act), you may   receive lab or pathology results from your procedure in your [facility]   account before your physician is able to contact you. Your physician or   their representative will relay the results to you with their   recommendations at their soonest availability.  Thank you,  PROVATION

## 2025-07-10 NOTE — ASSESSMENT & PLAN NOTE
Anemia is likely due to Iron deficiency and possibly from occult LGIB. Most recent hemoglobin and hematocrit are listed below.  Recent Labs     07/09/25  1756 07/09/25  2329 07/10/25  0900   HGB 7.7* 7.4* 7.3*   HCT 27.7* 26.8* 26.2*     Plan  - Monitor serial CBC: Every 8 hours  - Transfuse PRBC if patient becomes hemodynamically unstable, symptomatic or H/H drops below 7/21.  - Patient has received 2 units of PRBCs on 7/8  - Patient's anemia is currently stable

## 2025-07-10 NOTE — SUBJECTIVE & OBJECTIVE
Interval History: Patient seen at bedside this morning with wife in room. Patient states that he is feeling good and was able to drink all his prep for colonoscopy planned today.    Review of Systems   Constitutional:  Negative for chills, fatigue and fever.   HENT:  Negative for congestion and sore throat.    Eyes:  Negative for pain and visual disturbance.   Respiratory:  Negative for cough and shortness of breath.    Cardiovascular:  Negative for chest pain and palpitations.   Gastrointestinal:  Negative for abdominal pain, diarrhea, nausea and vomiting.   Genitourinary:  Negative for difficulty urinating and dysuria.   Musculoskeletal:  Negative for arthralgias and myalgias.   Skin:  Negative for rash.   Neurological:  Negative for light-headedness and headaches.     Objective:     Vital Signs (Most Recent):  Temp: 99 °F (37.2 °C) (07/10/25 1300)  Pulse: (!) 113 (07/10/25 1315)  Resp: 20 (07/10/25 1315)  BP: 132/77 (07/10/25 1245)  SpO2: 96 % (07/10/25 1300) Vital Signs (24h Range):  Temp:  [97.6 °F (36.4 °C)-99.3 °F (37.4 °C)] 99 °F (37.2 °C)  Pulse:  [] 113  Resp:  [17-20] 20  SpO2:  [96 %-100 %] 96 %  BP: (113-180)/(61-97) 132/77     Weight: 115.7 kg (255 lb)  Body mass index is 34.58 kg/m².    Intake/Output Summary (Last 24 hours) at 7/10/2025 1427  Last data filed at 7/10/2025 1205  Gross per 24 hour   Intake 1000 ml   Output --   Net 1000 ml         Physical Exam  Constitutional:       General: He is not in acute distress.     Appearance: Normal appearance. He is not ill-appearing.   HENT:      Head: Normocephalic and atraumatic.   Eyes:      Extraocular Movements: Extraocular movements intact.      Conjunctiva/sclera: Conjunctivae normal.      Pupils: Pupils are equal, round, and reactive to light.   Cardiovascular:      Rate and Rhythm: Normal rate and regular rhythm.      Pulses: Normal pulses.      Heart sounds: Normal heart sounds. No murmur heard.     No gallop.   Pulmonary:      Effort:  Pulmonary effort is normal. No respiratory distress.      Breath sounds: Normal breath sounds. No wheezing, rhonchi or rales.   Abdominal:      General: Bowel sounds are normal.      Palpations: Abdomen is soft.      Tenderness: There is no abdominal tenderness.   Musculoskeletal:         General: No swelling, tenderness or deformity. Normal range of motion.      Cervical back: Normal range of motion.   Skin:     General: Skin is warm and dry.      Findings: No rash.   Neurological:      General: No focal deficit present.      Mental Status: He is alert and oriented to person, place, and time.               Significant Labs: All pertinent labs within the past 24 hours have been reviewed.    Significant Imaging: I have reviewed all pertinent imaging results/findings within the past 24 hours.

## 2025-07-10 NOTE — TREATMENT PLAN
GI Treatment Plan     S/p Colonoscopy   Impression:            - Preparation of the colon was poor.                          - The examined portion of the ileum was normal.                          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 Biopsied. Tattooed.                          - Diverticulosis in the entire examined colon.                          - A single non-bleeding colonic angioectasia.                          Treated with argon plasma coagulation (APC).   Recommendation:        - Return patient to [facility] [place] for ongoing care.                          - Resume previous diet.                          - Continue present medications.                          - Await pathology results.                          - The findings and recommendations were discussed                          with the patient.                          - The findings and recommendations were discussed                          with the patient's family.                          - The findings and recommendations were discussed                          with the referring physician.                          - Recommend discussion with colorectal surgery                          regarding this malignant mass in ascending colon.                          - Repeat colonoscopy in 1 year for surveillance.     GI will sign off. Thank you for the consult.    Tammi Mccauley MD, MPH PGY-4  Gastroenterology Fellow   [facility]

## 2025-07-10 NOTE — PROGRESS NOTES
[facility] Medicine  Progress Note    Patient Name: Martín Thomason  MRN: [id number]  Patient Class: IP- Inpatient   Admission Date: 7/8/2025  Length of Stay: 2 days  Attending Physician: Marilu Gambino MD  Primary Care Provider: Primo Culver MD        Subjective     Principal Problem:Anemia        HPI:  59-year-old male presents to [facility] under the instruction of his PCP for blood transfusions secondary to anemia (Hgb < 7). He had a hemoglobin of 5.6 that was drawn 11 days ago, he says at this time he felt short of breath and very tired. He opted to take iron supplements and eat iron heavy foods. He reports to me that he has a history of internal hemorrhoids for which he noticed bright red blood in his stools about 3 weeks prior. He applied a rectal cream which he says helped alleviate his dyschezia and hematochezia. He denies any further episodes since. States that he has had several colonoscopies previously has never had any issues. He has never required a blood transfusion. He denies lightheadedness, chest pain, shortness of breath, fatigue. Of note, the patient has a colonoscopy scheduled for 7/10.     In the ED he presented with VSS, Hgb 6.6, al other lab work and imaging un-remarkable.     Overview/Hospital Course:  59-year-old male presents to [facility] under the instruction of his PCP for blood transfusions secondary to anemia (Hgb < 7). He had a hemoglobin of 5.6 that was drawn 11 days ago, he says at this time he felt short of breath and very tired. In the ED he presented with VSS, Hgb 6.6, MCV 70, Fe 13, TIBC 471, transferrin 318, ferritin 12, retic 4.4. Patient received 2 units of PRBCs. Hgb after 2 units of PRBC increased to 7.2. Patient was scheduled for colonoscopy outpatient for 7/10, but GI was consulted and will plan to do colonoscopy inpatient on 7/10. Colonoscopy showed malignant partially obstructing tumor in the ascending colon which was biopsied and tattooed, diverticulitis in  the entire examined colon, and a single non-bleeding colonic angioectasia. CTAP with IV. CEA at 8.4. Colorectal surgery consulted.    Interval History: Patient seen at bedside this morning with wife in room. Patient states that he is feeling good and was able to drink all his prep for colonoscopy planned today.    Review of Systems   Constitutional:  Negative for chills, fatigue and fever.   HENT:  Negative for congestion and sore throat.    Eyes:  Negative for pain and visual disturbance.   Respiratory:  Negative for cough and shortness of breath.    Cardiovascular:  Negative for chest pain and palpitations.   Gastrointestinal:  Negative for abdominal pain, diarrhea, nausea and vomiting.   Genitourinary:  Negative for difficulty urinating and dysuria.   Musculoskeletal:  Negative for arthralgias and myalgias.   Skin:  Negative for rash.   Neurological:  Negative for light-headedness and headaches.     Objective:     Vital Signs (Most Recent):  Temp: 99 °F (37.2 °C) (07/10/25 1300)  Pulse: (!) 113 (07/10/25 1315)  Resp: 20 (07/10/25 1315)  BP: 132/77 (07/10/25 1245)  SpO2: 96 % (07/10/25 1300) Vital Signs (24h Range):  Temp:  [97.6 °F (36.4 °C)-99.3 °F (37.4 °C)] 99 °F (37.2 °C)  Pulse:  [] 113  Resp:  [17-20] 20  SpO2:  [96 %-100 %] 96 %  BP: (113-180)/(61-97) 132/77     Weight: 115.7 kg (255 lb)  Body mass index is 34.58 kg/m².    Intake/Output Summary (Last 24 hours) at 7/10/2025 1427  Last data filed at 7/10/2025 1205  Gross per 24 hour   Intake 1000 ml   Output --   Net 1000 ml         Physical Exam  Constitutional:       General: He is not in acute distress.     Appearance: Normal appearance. He is not ill-appearing.   HENT:      Head: Normocephalic and atraumatic.   Eyes:      Extraocular Movements: Extraocular movements intact.      Conjunctiva/sclera: Conjunctivae normal.      Pupils: Pupils are equal, round, and reactive to light.   Cardiovascular:      Rate and Rhythm: Normal rate and regular  rhythm.      Pulses: Normal pulses.      Heart sounds: Normal heart sounds. No murmur heard.     No gallop.   Pulmonary:      Effort: Pulmonary effort is normal. No respiratory distress.      Breath sounds: Normal breath sounds. No wheezing, rhonchi or rales.   Abdominal:      General: Bowel sounds are normal.      Palpations: Abdomen is soft.      Tenderness: There is no abdominal tenderness.   Musculoskeletal:         General: No swelling, tenderness or deformity. Normal range of motion.      Cervical back: Normal range of motion.   Skin:     General: Skin is warm and dry.      Findings: No rash.   Neurological:      General: No focal deficit present.      Mental Status: He is alert and oriented to person, place, and time.               Significant Labs: All pertinent labs within the past 24 hours have been reviewed.    Significant Imaging: I have reviewed all pertinent imaging results/findings within the past 24 hours.      Assessment & Plan  Colonic mass  Anemia  Anemia is likely due to Iron deficiency. Most recent hemoglobin and hematocrit are listed below.  Recent Labs     07/09/25  1756 07/09/25  2329 07/10/25  0900   HGB 7.7* 7.4* 7.3*   HCT 27.7* 26.8* 26.2*     Colonoscopy performed on 7/10 showed malignant partially obstructing tumor in the ascending colon which was biopsied and tattooed, diverticulitis in the entire examined colon, and a single non-bleeding colonic angioectasia.     Plan  - Monitor serial CBC: Every 8 hours  - Transfuse PRBC if patient becomes hemodynamically unstable, symptomatic or H/H drops below 7/21.  - Patient has received 2 units of PRBCs on 7/8  - Patient's anemia is currently stable  - Repeat Iron studies on    - Ferritin 12.0   - TIBC 471   - Fe 13  - Patient with colonoscopy scheduled on 7/10  - Will defer GI consultation during admission as patient is HDS with no evidence of active bleeding. GI consult reasonable should patient H/H continue to decline despite appropriate  transfusion and/or patient becomes hemodynamically unstable.   - CTAP with IV contrast ordered  - CEA at 8.4  - Colorectal surgery consulted      Abnormal liver enzymes  Noted, no indication for investigation at this time    Orthostasis  Patient with history of orthostasis, however no signs/symptoms of this inpatient. CTM and low threshold for repeating orthostatics should suspicion arise.       Iron deficiency anemia  Anemia is likely due to Iron deficiency and possibly from occult LGIB. Most recent hemoglobin and hematocrit are listed below.  Recent Labs     07/09/25  1756 07/09/25  2329 07/10/25  0900   HGB 7.7* 7.4* 7.3*   HCT 27.7* 26.8* 26.2*     Plan  - Monitor serial CBC: Every 8 hours  - Transfuse PRBC if patient becomes hemodynamically unstable, symptomatic or H/H drops below 7/21.  - Patient has received 2 units of PRBCs on 7/8  - Patient's anemia is currently stable  Hyponatremia    Recent Labs     07/08/25  2229 07/09/25  0444 07/10/25  0217    138 133*     Plan   - Will treat the hyponatremia with IV fluids as follows: LR 500mL bolus  - Monitor sodium Daily.   - Patient hyponatremia is stable    VTE Risk Mitigation (From admission, onward)           Ordered     IP VTE HIGH RISK PATIENT  Once         07/08/25 2242     Place sequential compression device  Until discontinued         07/08/25 2242     Reason for No Pharmacological VTE Prophylaxis  Once        Question:  Reasons:  Answer:  Active Bleeding    07/08/25 2242                    Discharge Planning   RAMO: 7/11/2025     Code Status: Full Code   Medical Readiness for Discharge Date:   Discharge Plan A: Home with family, Home                        Cody Tee DO  Department of Hospital Medicine   [facility] Surg

## 2025-07-10 NOTE — CONSULTS
[facility]  Colorectal Surgery  Consult Note    Patient Name: Martín Thomason  MRN: [id number]  Admission Date: 7/8/2025  Hospital Length of Stay: 2 days  Attending Physician: Marilu Gambino MD  Primary Care Provider: Primo Culver MD    Inpatient consult to Colorectal Surgery  Consult performed by: Matthew Padgett MD  Consult ordered by: Bruna Mon MD        Subjective:       History of Present Illness:     60 y/o M presented with symptomatic anemia (Hgb 5.6 at its lowest)  - underwent EGD/Colonoscopy with GI today. He received 2 units of pRBCs on 7/8/25  -he has since had stable Hgbs.   CRS consulted to address findings of a malignant-appearing ascending colon mass (biopsied and tattooed distally).    Patient himself reports that he has been is his usual health other than recent episodes of lightheadedness/dizziness. He denies abdominal pain, nausea, vomiting, weight loss, changes in bowel function, bloody stools or other associated symptoms.    PMH: Denies  PSH: knee surgery  Meds: denies  Allergies: NKDA  SH: denies smoking, endorsed ETOH use (daily but not heavy drinking)  FH: denies FH of colon/rectal cancer. Grandfather with stomach cancer    Current Facility-Administered Medications   Medication    0.9%  NaCl infusion (for blood administration)    dextrose 50% injection 12.5 g    dextrose 50% injection 25 g    ferumoxytol (Feraheme) 510 mg in D5W 117 mL IVPB    glucagon (human recombinant) injection 1 mg    glucose chewable tablet 16 g    glucose chewable tablet 24 g    mupirocin 2 % ointment    naloxone 0.4 mg/mL injection 0.02 mg    sodium chloride 0.9% flush 10 mL       Review of patient's allergies indicates:  No Known Allergies    History reviewed. No pertinent past medical history.  Past Surgical History:   Procedure Laterality Date    COLONOSCOPY, SCREENING, LOW RISK PATIENT N/A 7/3/2025    Procedure: COLONOSCOPY, SCREENING, LOW RISK PATIENT;  Surgeon: Christelle Valencia MD;   Location: [facility] ENDOSCOPY;  Service: Endoscopy;  Laterality: N/A;  7/1 pre call complete [facility]    KNEE ARTHROPLASTY Right 09/1998     Family History    None       Tobacco Use    Smoking status: Never    Smokeless tobacco: Never   Substance and Sexual Activity    Alcohol use: Yes     Alcohol/week: 12.0 standard drinks of alcohol     Types: 10 Glasses of wine, 2 Shots of liquor per week    Drug use: Never    Sexual activity: Yes     Review of Systems  Objective:     Vital Signs (Most Recent):  Temp: 99 °F (37.2 °C) (07/10/25 1300)  Pulse: (!) 113 (07/10/25 1315)  Resp: 20 (07/10/25 1315)  BP: 132/77 (07/10/25 1245)  SpO2: 96 % (07/10/25 1300) Vital Signs (24h Range):  Temp:  [97.6 °F (36.4 °C)-99.3 °F (37.4 °C)] 99 °F (37.2 °C)  Pulse:  [] 113  Resp:  [17-20] 20  SpO2:  [96 %-100 %] 96 %  BP: (113-180)/(61-97) 132/77     Weight: 115.7 kg (255 lb)  Body mass index is 34.58 kg/m².    Physical Exam    General: NAD, AAOx3, well-appearing  Resp: non-labored breathing on room air  Abdomen: soft, NT, ND    Significant Labs:  BMP (Last 3 Results):   Recent Labs   Lab 07/08/25  2229 07/09/25  0444 07/10/25  0217   * 107 110    138 133*   K 3.8 4.0 3.6    107 100   CO2 21* 23 22*   BUN 11 12 9   CREATININE 0.9 0.9 0.9   CALCIUM 8.5* 8.8 9.0   MG  --  1.6 1.7     CBC (Last 3 Results):   Recent Labs   Lab 07/09/25  1756 07/09/25  2329 07/10/25  0900   WBC 10.19 9.42 8.09   RBC 3.85* 3.72* 3.62*   HGB 7.7* 7.4* 7.3*   HCT 27.7* 26.8* 26.2*    271 277   MCV 72* 72* 72*   MCH 20.0* 19.9* 20.2*   MCHC 27.8* 27.6* 27.9*     CMP (Last 3 Results):   Recent Labs   Lab 07/08/25  2229 07/09/25  0444 07/10/25  0217   * 107 110   CALCIUM 8.5* 8.8 9.0   ALBUMIN 3.5 3.2* 3.5   PROT 7.8 7.2 8.2    138 133*   K 3.8 4.0 3.6   CO2 21* 23 22*    107 100   BUN 11 12 9   CREATININE 0.9 0.9 0.9   ALKPHOS 77 72 82   ALT 30 29 24   AST 49* 47* 34   BILITOT 0.6 0.9 1.6*     CEA (7/10/25):  8.4      Assessment/Plan:     Active Diagnoses:    Diagnosis Date Noted POA    PRINCIPAL PROBLEM:  Colonic mass [K63.89] 07/10/2025 Yes    Hyponatremia [E87.1] 07/10/2025 No    Iron deficiency anemia [D50.9] 07/08/2025 Unknown    Orthostasis [I95.1] 06/27/2025 Yes    Anemia [D64.9] 04/21/2025 Yes    Abnormal liver enzymes [R74.8] 04/21/2025 Yes      Problems Resolved During this Admission:     60 y/o M with anemia and malignant-appearing ascending colon mass.    - Discussed with patient and wife concern for colon cancer. Next step would be to complete staging with CT Chest and Abdomen/Pelvis. CEA level obtained.  Review of staging CT and pathology results from the biopsy will determine appropriate treatment plan. We briefly discussed the role of surgery - there is no current indication for an urgent/emergent operation.    - Continue to ensure stable Hgb and tolerance of diet with bowel function. Ongoing care per primary team.    - Will arrange close follow-up at CRS clinic to discuss and plan for treatment.      Matthew Padgett MD  Colorectal Surgery  [facility]

## 2025-07-10 NOTE — TRANSFER OF CARE
Anesthesia Transfer of Care Note    Patient: Martín Thomason    Procedure(s) Performed: Procedure(s) (LRB):  EGD (ESOPHAGOGASTRODUODENOSCOPY) (N/A)  COLONOSCOPY (N/A)    Patient location: PACU    Anesthesia Type: general    Transport from OR: Transported from OR on 6-10 L/min O2 by face mask with adequate spontaneous ventilation    Post pain: adequate analgesia    Post assessment: no apparent anesthetic complications and tolerated procedure well    Post vital signs: stable    Level of consciousness: awake    Nausea/Vomiting: no nausea/vomiting    Complications: none    Transfer of care protocol was followed      Last vitals: Visit Vitals  /61 (BP Location: Left arm, Patient Position: Lying)   Pulse (!) 115   Temp 37.4 °C (99.3 °F) (Temporal)   Resp 20   Ht 6' (1.829 m)   Wt 115.7 kg (255 lb)   SpO2 97%   BMI 34.58 kg/m²

## 2025-07-10 NOTE — NURSING TRANSFER
Nursing Transfer Note      7/10/2025   12:50 PM    Nurse giving handoff:Jacinto alexandra Rn  Nurse receiving handoff:Ovi    Reason patient is being transferred: postop    Transfer To: 648    Transfer via stretcher    Transfer with n/a    Transported by pacu pct    Transfer Vital Signs:  Blood Pressure:see flowsheet  Heart Rate:  O2:  Temperature:  Respirations:    Telemetry: n/a  Order for Tele Monitor? N/a    Additional Lines: n/a    Medicines sent: n/a    Any special needs or follow-up needed:     Patient belongings transferred with patient: Yes    Chart send with patient: Yes    Notified: spouse    Patient reassessed at: 7/10/25  1  Upon arrival to floor: bed in lowest position

## 2025-07-10 NOTE — ASSESSMENT & PLAN NOTE
Anemia is likely due to Iron deficiency. Most recent hemoglobin and hematocrit are listed below.  Recent Labs     07/09/25  1756 07/09/25  2329 07/10/25  0900   HGB 7.7* 7.4* 7.3*   HCT 27.7* 26.8* 26.2*     Colonoscopy performed on 7/10 showed malignant partially obstructing tumor in the ascending colon which was biopsied and tattooed, diverticulitis in the entire examined colon, and a single non-bleeding colonic angioectasia.     Plan  - Monitor serial CBC: Every 8 hours  - Transfuse PRBC if patient becomes hemodynamically unstable, symptomatic or H/H drops below 7/21.  - Patient has received 2 units of PRBCs on 7/8  - Patient's anemia is currently stable  - Repeat Iron studies on    - Ferritin 12.0   - TIBC 471   - Fe 13  - Patient with colonoscopy scheduled on 7/10  - Will defer GI consultation during admission as patient is HDS with no evidence of active bleeding. GI consult reasonable should patient H/H continue to decline despite appropriate transfusion and/or patient becomes hemodynamically unstable.   - CTAP with IV contrast ordered  - CEA at 8.4  - Colorectal surgery consulted

## 2025-07-10 NOTE — PROVATION PATIENT INSTRUCTIONS
Discharge Summary/Instructions after an Endoscopic Procedure  Patient Name: Martín Thomason  Patient MRN: [id number]  Patient YOB: 1965  Thursday, July 10, 2025  Yamil Amor MD  Dear patient,  As a result of recent federal legislation (The Federal Cures Act), you may   receive lab or pathology results from your procedure in your [facility]   account before your physician is able to contact you. Your physician or   their representative will relay the results to you with their   recommendations at their soonest availability.  Thank you,  RESTRICTIONS:  During your procedure today, you received medications for sedation.  These   medications may affect your judgment, balance and coordination.  Therefore,   for 24 hours, you have the following restrictions:   - DO NOT drive a car, operate machinery, make legal/financial decisions,   sign important papers or drink alcohol.    ACTIVITY:  Today: no heavy lifting, straining or running due to procedural   sedation/anesthesia.  The following day: return to full activity including work.  DIET:  Eat and drink normally unless instructed otherwise.     TREATMENT FOR COMMON SIDE EFFECTS:  - Mild abdominal pain, nausea, belching, bloating or excessive gas:  rest,   eat lightly and use a heating pad.  - Sore Throat: treat with throat lozenges and/or gargle with warm salt   water.  - Because air was used during the procedure, expelling large amounts of air   from your rectum or belching is normal.  - If a bowel prep was taken, you may not have a bowel movement for 1-3 days.    This is normal.  SYMPTOMS TO WATCH FOR AND REPORT TO YOUR PHYSICIAN:  1. Abdominal pain or bloating, other than gas cramps.  2. Chest pain.  3. Back pain.  4. Signs of infection such as: chills or fever occurring within 24 hours   after the procedure.  5. Rectal bleeding, which would show as bright red, maroon, or black stools.   (A tablespoon of blood from the rectum is not serious, especially  if   hemorrhoids are present.)  6. Vomiting.  7. Weakness or dizziness.  GO DIRECTLY TO THE NEAREST EMERGENCY ROOM IF YOU HAVE ANY OF THE FOLLOWING:      Difficulty breathing              Chills and/or fever over 101 F   Persistent vomiting and/or vomiting blood   Severe abdominal pain   Severe chest pain   Black, tarry stools   Bleeding- more than one tablespoon   Any other symptom or condition that you feel may need urgent attention  Your doctor recommends these additional instructions:  If any biopsies were taken, your doctors clinic will contact you in 1 to 2   weeks with any results.  - Return patient to [facility] [place] for ongoing care.   - Resume previous diet.   - Continue present medications.   - Perform a colonoscopy today.  For questions, problems or results please call your physician - Yamil Amor MD at Work:  [phone].  [facility], EMERGENCY ROOM PHONE NUMBER: [phone]  IF A COMPLICATION OR EMERGENCY SITUATION ARISES AND YOU ARE UNABLE TO REACH   YOUR PHYSICIAN - GO DIRECTLY TO THE EMERGENCY ROOM.  Yamil Amor MD  7/10/2025 11:42:55 AM  This report has been verified and signed electronically.  Dear patient,  As a result of recent federal legislation (The Federal Cures Act), you may   receive lab or pathology results from your procedure in your [facility]   account before your physician is able to contact you. Your physician or   their representative will relay the results to you with their   recommendations at their soonest availability.  Thank you,  PROVATION

## 2025-07-10 NOTE — H&P
Short Stay Endoscopy History and Physical    PCP - Primo Culver MD    Procedure - EGD & Colonoscopy  ASA - per anesthesia  Mallampati - per anesthesia  Plan of anesthesia - MAC    HPI:  This is a 59 y.o. male here for evaluation of : KARINA    ROS:  Constitutional: No fevers, chills  CV: No chest pain  Pulm: No cough  Ophtho: No vision changes  GI: see HPI  Derm: No rash    Medical History:  has no past medical history on file.    Surgical History:  has a past surgical history that includes Knee Arthroplasty (Right, 09/1998) and colonoscopy, screening, low risk patient (N/A, 7/3/2025).    Family History: family history is not on file.. Otherwise no colon cancer, inflammatory bowel disease, or GI malignancies.    Social History:  reports that he has never smoked. He has never used smokeless tobacco. He reports current alcohol use of about 12.0 standard drinks of alcohol per week. He reports that he does not use drugs.    Review of patient's allergies indicates:  No Known Allergies    Medications:   Prescriptions Prior to Admission[1]      Vital Signs:   Vitals:    07/10/25 1049   BP: (!) 180/97   Pulse: 106   Resp: 18   Temp: 99.1 °F (37.3 °C)       General Appearance: Well appearing in no acute distress      Labs:  Lab Results   Component Value Date    WBC 8.09 07/10/2025    HGB 7.3 (L) 07/10/2025    HCT 26.2 (L) 07/10/2025     07/10/2025    CHOL 126 06/27/2025    TRIG 56 06/27/2025    HDL 60 06/27/2025    ALT 24 07/10/2025    AST 34 07/10/2025     (L) 07/10/2025    K 3.6 07/10/2025     07/10/2025    CREATININE 0.9 07/10/2025    BUN 9 07/10/2025    CO2 22 (L) 07/10/2025    PSA 11.44 (H) 06/27/2025    HGBA1C 5.4 06/27/2025       I have explained the risks and benefits of endoscopy procedures to the patient/their POA including but not limited to bleeding, perforation, infection, and death.  The patient/their POA was asked if they understand and allowed to ask any further questions to their  satisfaction.    Yemi Huggins MD         [1]   Medications Prior to Admission   Medication Sig Dispense Refill Last Dose/Taking    colchicine (COLCRYS) 0.6 mg tablet Take 0.6 mg by mouth once daily.   Taking

## 2025-07-10 NOTE — MEDICAL/APP STUDENT
[facility] Medicine Student   Progress Note  [facility]    Admit Date: 7/8/2025  Hospital Day: 2  07/10/2025  8:44 AM    SUBJECTIVE:   HPI:  Mr. Martín Thomason is a 59 y.o. male with history of internal hemorrhoids and hiatal hernia who presented to ED on PCPs recommendation as Hgb was 5.6. Patient has some lightheadedness and shortness of breath on standing but denies any N/V, CP, headaches, or black/bloody bowel movements. Patient does not take any NSAIDs. He has a family history of anemia in his mother. No history of previous transfusions. Patient takes aspirin 80mg daily. Patient has never smoked. He drinks 2 glasses of wine a day. No history of recreational drug use.    In ED patient was transfused 2u PRBCs and stool was guaiac positive.    Admitted to hospital medicine for anemia and work up of possible GI bleed.    Hospital Course:  Hgb now 7.4 and stable. EGD/colonoscopy scheduled for this morning.    Interval history:   No significant events overnight. Patient completed prep and has been having clear bowel movements with no blood.     Review of Systems   Constitutional:  Negative for chills and fever.   Respiratory:  Negative for shortness of breath.    Cardiovascular:  Negative for chest pain and palpitations.   Gastrointestinal:  Negative for abdominal pain and blood in stool.   Neurological:  Negative for dizziness.       Please refer to the H&P for past medical, family, and social history.    OBJECTIVE:     Vital Signs Recent:  Temp: 97.8 °F (36.6 °C) (07/10/25 0814)  Pulse: (!) 118 (07/10/25 0814)  Resp: 18 (07/10/25 0814)  BP: (!) 157/93 (07/10/25 0814)  SpO2: 98 % (07/10/25 0814)  Oxygen Documentation:                Device (Oxygen Therapy): room air         Vital Signs Range (Last 24H):  Temp:  [97.6 °F (36.4 °C)-98.6 °F (37 °C)]   Pulse:  []   Resp:  [17-18]   BP: (156-164)/(87-99)   SpO2:  [98 %-100 %]        I & O (Last 24H):No intake or output data in the 24 hours ending 07/10/25 0844      Physical Exam:  Physical Exam  Constitutional:       General: He is not in acute distress.     Appearance: Normal appearance.   HENT:      Mouth/Throat:      Mouth: Mucous membranes are moist.   Cardiovascular:      Rate and Rhythm: Normal rate and regular rhythm.   Pulmonary:      Effort: Pulmonary effort is normal. No respiratory distress.      Breath sounds: Normal breath sounds.   Abdominal:      General: There is no distension.      Palpations: Abdomen is soft.      Tenderness: There is no abdominal tenderness.   Musculoskeletal:      Right lower leg: No edema.      Left lower leg: No edema.   Skin:     General: Skin is warm and dry.   Neurological:      General: No focal deficit present.      Mental Status: He is alert and oriented to person, place, and time.   Psychiatric:         Mood and Affect: Mood normal.         Behavior: Behavior normal.         Labs:   Recent Labs   Lab 07/08/25  2229 07/09/25  0444 07/10/25  0217    138 133*   K 3.8 4.0 3.6    107 100   CO2 21* 23 22*   BUN 11 12 9   CREATININE 0.9 0.9 0.9   * 107 110   CALCIUM 8.5* 8.8 9.0   MG  --  1.6 1.7   PHOS  --  3.3 2.7     Recent Labs   Lab 07/08/25 2229 07/09/25 0444 07/10/25  0217   ALKPHOS 77 72 82   ALT 30 29 24   AST 49* 47* 34   ALBUMIN 3.5 3.2* 3.5   PROT 7.8 7.2 8.2   BILITOT 0.6 0.9 1.6*     Recent Labs   Lab 07/09/25  1142 07/09/25  1756 07/09/25  2329   WBC 7.87 10.19 9.42   HGB 7.2* 7.7* 7.4*   HCT 26.2* 27.7* 26.8*    263 271       Diagnostic Results:  EGD 7/10/25  Impression:              - Normal esophagus.   - Normal stomach.   - A single non-bleeding angioectasia in the duodenum. Treated with argon plasma coagulation (APC).   - No specimens collected.     Colonoscopy 7/10/25  Impression:              - Preparation of the colon was poor.   - The examined portion of the ileum was normal.   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 Biopsied. Tattooed.   - Diverticulosis in  the entire examined colon.   - A single non-bleeding colonic angioectasia. Treated with argon plasma coagulation (APC).     Scheduled Meds:   mupirocin   Nasal BID     Continuous Infusions:  PRN Meds:  Current Facility-Administered Medications:     0.9%  NaCl infusion (for blood administration), , Intravenous, Q24H PRN    dextrose 50%, 12.5 g, Intravenous, PRN    dextrose 50%, 25 g, Intravenous, PRN    glucagon (human recombinant), 1 mg, Intramuscular, PRN    glucose, 16 g, Oral, PRN    glucose, 24 g, Oral, PRN    naloxone, 0.02 mg, Intravenous, PRN    sodium chloride 0.9%, 10 mL, Intravenous, Q12H PRN    ASSESSMENT/PLAN:   Mr. Martín Thomason is a 59 y.o. male with history of internal hemorrhoids and hiatal hernia admitted to hospital medicine for anemia with Hgb 6.6 on arrival. After receiving 2u PRBCs Hgb now >7 and stable. Iron 11, TIBC 487, transferrin 329, ferritin 6, and iron saturation 2. Patient is asymptomatic and prepped for EGD/colonoscopy.    EGD/Colonoscopy significant for malignant appearing partially obstructing mass in the ascending colon as well as diverticulosis and a few angioectasias in the duodenum and colon that were APC'd. Biopsied and tattooed.     Active Hospital Problems    Diagnosis  POA    *Anemia [D64.9]  Yes    Iron deficiency anemia [D50.9]  Unknown    Orthostasis [I95.1]  Yes    Abnormal liver enzymes [R74.8]  Yes      Resolved Hospital Problems   No resolved problems to display.       Anemia secondary to Malignant Mass in Ascending Colon  Hgb now stable >7 after receiving 2u PRBCs. Patient asymptomatic. Iron 11, TIBC 487, transferrin 329, ferritin 6, and iron saturation 2.  Plan:  CT scan and CEA ordered  CRS consulted  Diet resumed    2. Internal Hemorrhoids  No reports of active bleeding   Plan:  Follow up outpatient if symptomatic    3. Hiatal hernia  Stable  Plan:  Follow up outpatient if symptomatic    Discharge planning:   Malignancy found in ascending colon. CT and CEA  ordered. Patient asymptomatic and has not shown signs of active bleeding. Will likely be stable for discharge with continued outpatient work-up and staging.

## 2025-07-10 NOTE — CARE UPDATE
Unit DEEPAK Care Support Interaction      I have reviewed the chart of Martín Thomason who is hospitalized for Anemia. The patient is currently located in the following unit: [facility]        I have assisted the primary physician in management of the following:      MRSA Decolonization - Mupirocin ordered            GERA Guerra  Unit Based DEPEAK

## 2025-07-10 NOTE — DISCHARGE INSTRUCTIONS
Our goal at [facility] is to always give you outstanding care and exceptional service. You may receive a survey from [employer] by mail, text or e-mail in the next 24-48 hours asking about the care you received with us. The survey should only take 5-10 minutes to complete and is very important to us.     Your feedback provides us with a way to recognize our staff who work tirelessly to provide the best care! Also, your responses help us learn how to improve when your experience was below our aspiration of excellence. We are always looking for ways to improve your stay. We WILL use your feedback to continue making improvements to help us provide the highest quality care. We keep your personal information and feedback confidential. We appreciate your time completing this survey and can't wait to hear from you!!!    We look forward to your continued care with us! Thanks so much for choosing [facility] for your healthcare needs!

## 2025-07-10 NOTE — PLAN OF CARE
[facility] Med Surg  Discharge Reassessment    Primary Care Provider: Primo Culver MD    Expected Discharge Date: 7/11/2025    Reassessment (most recent)       Discharge Reassessment - 07/10/25 1634          Discharge Reassessment    Assessment Type Discharge Planning Reassessment (P)      Did the patient's condition or plan change since previous assessment? No (P)      Discharge Plan discussed with: Patient (P)      Communicated RAMO with patient/caregiver Yes (P)      Discharge Plan A Home;Home with family (P)      Discharge Plan B Home;Home with family (P)      DME Needed Upon Discharge  none (P)      Transition of Care Barriers None (P)      Why the patient remains in the hospital Requires continued medical care (P)         Post-Acute Status    Discharge Delays None known at this time (P)                    CM spoke with pt at bedside, EGD/Colonoscopy completed today awaiting results. Discharge plan is home with family when med ready.  Will cont to coordinate care until discharge.     Discharge Plan A and Plan B have been determined by review of patient's clinical status, future medical and therapeutic needs, and coverage/benefits for post-acute care in coordination with multidisciplinary team members.     Kristina Helm, MSN   [facility]  [phone]

## 2025-07-10 NOTE — ANESTHESIA POSTPROCEDURE EVALUATION
Anesthesia Post Evaluation    Patient: Martín Thomason    Procedure(s) Performed: Procedure(s) (LRB):  EGD (ESOPHAGOGASTRODUODENOSCOPY) (N/A)  COLONOSCOPY (N/A)    Final Anesthesia Type: general      Patient participation: Yes- Able to Participate  Level of consciousness: awake and alert  Post-procedure vital signs: reviewed and stable  Pain control: Pain has been treated.  Airway patency: patent    PONV status: Absent or treated.  Anesthetic complications: no      Cardiovascular status: hemodynamically stable  Respiratory status: unassisted  Hydration status: euvolemic  Follow-up not needed.              Vitals Value Taken Time   /77 07/10/25 12:47   Temp 37.2 °C (99 °F) 07/10/25 13:00   Pulse 112 07/10/25 13:17   Resp 33 07/10/25 13:17   SpO2 96 % 07/10/25 13:00   Vitals shown include unfiled device data.      Event Time   Out of Recovery 13:21:00         Pain/Surya Score: Surya Score: 10 (7/10/2025  1:00 PM)

## 2025-07-10 NOTE — ASSESSMENT & PLAN NOTE
Recent Labs     07/08/25  2229 07/09/25  0444 07/10/25  0217    138 133*     Plan   - Will treat the hyponatremia with IV fluids as follows: LR 500mL bolus  - Monitor sodium Daily.   - Patient hyponatremia is stable

## 2025-07-10 NOTE — NURSING
Notified on call MD Belkis Alfonso that patients prefers not to have any vital signs taken at midnight and 4am.

## 2025-07-11 VITALS
WEIGHT: 255 LBS | SYSTOLIC BLOOD PRESSURE: 167 MMHG | RESPIRATION RATE: 16 BRPM | HEIGHT: 72 IN | HEART RATE: 93 BPM | DIASTOLIC BLOOD PRESSURE: 99 MMHG | BODY MASS INDEX: 34.54 KG/M2 | TEMPERATURE: 98 F | OXYGEN SATURATION: 100 %

## 2025-07-11 LAB
ABO + RH BLD: NORMAL
ABSOLUTE EOSINOPHIL (OHS): 0.12 K/UL
ABSOLUTE MONOCYTE (OHS): 1.05 K/UL (ref 0.3–1)
ABSOLUTE NEUTROPHIL COUNT (OHS): 5.39 K/UL (ref 1.8–7.7)
ALBUMIN SERPL BCP-MCNC: 2.8 G/DL (ref 3.5–5.2)
ALP SERPL-CCNC: 64 UNIT/L (ref 40–150)
ALT SERPL W/O P-5'-P-CCNC: 17 UNIT/L (ref 10–44)
ANION GAP (OHS): 7 MMOL/L (ref 8–16)
AST SERPL-CCNC: 27 UNIT/L (ref 11–45)
BASOPHILS # BLD AUTO: 0.05 K/UL
BASOPHILS NFR BLD AUTO: 0.6 %
BILIRUB SERPL-MCNC: 1.1 MG/DL (ref 0.1–1)
BLD PROD TYP BPU: NORMAL
BLOOD UNIT EXPIRATION DATE: NORMAL
BLOOD UNIT TYPE CODE: 5100
BUN SERPL-MCNC: 8 MG/DL (ref 8–23)
CALCIUM SERPL-MCNC: 8.5 MG/DL (ref 8.7–10.5)
CHLORIDE SERPL-SCNC: 106 MMOL/L (ref 95–110)
CO2 SERPL-SCNC: 21 MMOL/L (ref 23–29)
CREAT SERPL-MCNC: 1 MG/DL (ref 0.5–1.4)
CROSSMATCH INTERPRETATION: NORMAL
DISPENSE STATUS: NORMAL
ERYTHROCYTE [DISTWIDTH] IN BLOOD BY AUTOMATED COUNT: 26.1 % (ref 11.5–14.5)
ERYTHROCYTE [DISTWIDTH] IN BLOOD BY AUTOMATED COUNT: 26.3 % (ref 11.5–14.5)
GFR SERPLBLD CREATININE-BSD FMLA CKD-EPI: >60 ML/MIN/1.73/M2
GLUCOSE SERPL-MCNC: 113 MG/DL (ref 70–110)
HCT VFR BLD AUTO: 24.5 % (ref 40–54)
HCT VFR BLD AUTO: 30.1 % (ref 40–54)
HGB BLD-MCNC: 6.9 GM/DL (ref 14–18)
HGB BLD-MCNC: 8.4 GM/DL (ref 14–18)
IMM GRANULOCYTES # BLD AUTO: 0.07 K/UL (ref 0–0.04)
IMM GRANULOCYTES NFR BLD AUTO: 0.8 % (ref 0–0.5)
LYMPHOCYTES # BLD AUTO: 2.12 K/UL (ref 1–4.8)
MAGNESIUM SERPL-MCNC: 1.6 MG/DL (ref 1.6–2.6)
MCH RBC QN AUTO: 20.5 PG (ref 27–31)
MCH RBC QN AUTO: 20.9 PG (ref 27–31)
MCHC RBC AUTO-ENTMCNC: 27.9 G/DL (ref 32–36)
MCHC RBC AUTO-ENTMCNC: 28.2 G/DL (ref 32–36)
MCV RBC AUTO: 73 FL (ref 82–98)
MCV RBC AUTO: 75 FL (ref 82–98)
NUCLEATED RBC (/100WBC) (OHS): 1 /100 WBC
OHS QRS DURATION: 94 MS
OHS QTC CALCULATION: 443 MS
PHOSPHATE SERPL-MCNC: 3.4 MG/DL (ref 2.7–4.5)
PLATELET # BLD AUTO: 250 K/UL (ref 150–450)
PLATELET # BLD AUTO: 321 K/UL (ref 150–450)
PMV BLD AUTO: 10.3 FL (ref 9.2–12.9)
PMV BLD AUTO: 9.8 FL (ref 9.2–12.9)
POTASSIUM SERPL-SCNC: 3.9 MMOL/L (ref 3.5–5.1)
PROT SERPL-MCNC: 6.8 GM/DL (ref 6–8.4)
RBC # BLD AUTO: 3.37 M/UL (ref 4.6–6.2)
RBC # BLD AUTO: 4.01 M/UL (ref 4.6–6.2)
RELATIVE EOSINOPHIL (OHS): 1.4 %
RELATIVE LYMPHOCYTE (OHS): 24.1 % (ref 18–48)
RELATIVE MONOCYTE (OHS): 11.9 % (ref 4–15)
RELATIVE NEUTROPHIL (OHS): 61.2 % (ref 38–73)
SODIUM SERPL-SCNC: 134 MMOL/L (ref 136–145)
UNIT NUMBER: NORMAL
WBC # BLD AUTO: 7.5 K/UL (ref 3.9–12.7)
WBC # BLD AUTO: 8.8 K/UL (ref 3.9–12.7)

## 2025-07-11 PROCEDURE — 36415 COLL VENOUS BLD VENIPUNCTURE: CPT

## 2025-07-11 PROCEDURE — P9016 RBC LEUKOCYTES REDUCED: HCPCS

## 2025-07-11 PROCEDURE — 80053 COMPREHEN METABOLIC PANEL: CPT

## 2025-07-11 PROCEDURE — 85027 COMPLETE CBC AUTOMATED: CPT | Performed by: STUDENT IN AN ORGANIZED HEALTH CARE EDUCATION/TRAINING PROGRAM

## 2025-07-11 PROCEDURE — 84100 ASSAY OF PHOSPHORUS: CPT

## 2025-07-11 PROCEDURE — 86920 COMPATIBILITY TEST SPIN: CPT

## 2025-07-11 PROCEDURE — 85025 COMPLETE CBC W/AUTO DIFF WBC: CPT

## 2025-07-11 PROCEDURE — 83735 ASSAY OF MAGNESIUM: CPT

## 2025-07-11 PROCEDURE — 63600175 PHARM REV CODE 636 W HCPCS: Performed by: STUDENT IN AN ORGANIZED HEALTH CARE EDUCATION/TRAINING PROGRAM

## 2025-07-11 RX ORDER — HYDROCODONE BITARTRATE AND ACETAMINOPHEN 500; 5 MG/1; MG/1
TABLET ORAL
Status: CANCELLED | OUTPATIENT
Start: 2025-07-11

## 2025-07-11 RX ORDER — HYDROCODONE BITARTRATE AND ACETAMINOPHEN 500; 5 MG/1; MG/1
TABLET ORAL
Status: DISCONTINUED | OUTPATIENT
Start: 2025-07-11 | End: 2025-07-11 | Stop reason: HOSPADM

## 2025-07-11 RX ORDER — MAGNESIUM SULFATE HEPTAHYDRATE 40 MG/ML
2 INJECTION, SOLUTION INTRAVENOUS ONCE
Status: COMPLETED | OUTPATIENT
Start: 2025-07-11 | End: 2025-07-11

## 2025-07-11 RX ADMIN — MAGNESIUM SULFATE HEPTAHYDRATE 2 G: 40 INJECTION, SOLUTION INTRAVENOUS at 10:07

## 2025-07-11 NOTE — SUBJECTIVE & OBJECTIVE
Subjective:     Interval History: passing gas, having non bloody soft formed stool, urinating, tolerating po, no abdominal pain    Post-Op Info:  Procedure(s) (LRB):  EGD (ESOPHAGOGASTRODUODENOSCOPY) (N/A)  COLONOSCOPY (N/A)   1 Day Post-Op      Medications:  Continuous Infusions:  Scheduled Meds:   k phos di & mono-sod phos mono  1 tablet Oral BID WM    magnesium sulfate 2 g IVPB  2 g Intravenous Once    mupirocin   Nasal BID     PRN Meds:   k phos di & mono-sod phos mono 250 mg tablet 1 tablet    magnesium sulfate 2g in water 50mL IVPB (premix)    mupirocin 2 % ointment        Objective:     Vital Signs (Most Recent):  Temp: 98 °F (36.7 °C) (07/11/25 0753)  Pulse: 96 (07/11/25 0753)  Resp: 17 (07/11/25 0753)  BP: (!) 165/86 (07/11/25 0753)  SpO2: 97 % (07/11/25 0753) Vital Signs (24h Range):  Temp:  [96.6 °F (35.9 °C)-99.3 °F (37.4 °C)] 98 °F (36.7 °C)  Pulse:  [] 96  Resp:  [17-20] 17  SpO2:  [96 %-100 %] 97 %  BP: (113-180)/(61-97) 165/86     Intake/Output - Last 3 Shifts         07/09 0700  07/10 0659 07/10 0700 07/11 0659 07/11 0700  07/12 0659    P.O.  240     Blood       IV Piggyback  1000     Total Intake(mL/kg)  1240 (10.7)     Net  +1240                     Physical Exam  Vitals reviewed.   Constitutional:       General: He is not in acute distress.     Appearance: Normal appearance. He is obese. He is not ill-appearing or toxic-appearing.   HENT:      Head: Normocephalic.      Mouth/Throat:      Mouth: Mucous membranes are moist.      Pharynx: Oropharynx is clear.   Eyes:      Extraocular Movements: Extraocular movements intact.      Conjunctiva/sclera: Conjunctivae normal.   Cardiovascular:      Rate and Rhythm: Normal rate and regular rhythm.   Pulmonary:      Effort: Pulmonary effort is normal. No respiratory distress.   Abdominal:      General: There is no distension.      Palpations: Abdomen is soft.      Tenderness: There is no abdominal tenderness. There is no guarding.   Musculoskeletal:          General: No swelling or deformity.   Skin:     General: Skin is warm and dry.   Neurological:      General: No focal deficit present.      Mental Status: He is alert. Mental status is at baseline.   Psychiatric:         Mood and Affect: Mood normal.         Behavior: Behavior normal.         Thought Content: Thought content normal.         Judgment: Judgment normal.       Significant Labs:  CBC (Last 3 Results):   Recent Labs   Lab 07/09/25  2329 07/10/25  0900 07/10/25  1909 07/11/25  0327   WBC 9.42 8.09  --  7.50   RBC 3.72* 3.62*  --  3.37*   HGB 7.4* 7.3* 7.4* 6.9*   HCT 26.8* 26.2*  --  24.5*    277  --  250   MCV 72* 72*  --  73*   MCH 19.9* 20.2*  --  20.5*   MCHC 27.6* 27.9*  --  28.2*       Significant Diagnostics:  I have reviewed all pertinent imaging results/findings within the past 24 hours.

## 2025-07-11 NOTE — ASSESSMENT & PLAN NOTE
60yo M with ascending colon mass suspicious for malignancy. Duodenum and transverse colon with angioectasia ablated but was not actively bleeding. CEA 8.4 elevated, CT CAP without obvious liver or lung nodules, concern for abdominal LN, final read pending.  - recommend transfusing another 1u prbc for hgb 6.9  - will follow up with pathology results from cscope biopsy  - follow up with staging CT results  - no urgent surgery at this time, pending stabilization of Hgb

## 2025-07-11 NOTE — ASSESSMENT & PLAN NOTE
Anemia is likely due to Iron deficiency and possibly from occult LGIB. Most recent hemoglobin and hematocrit are listed below.  Recent Labs     07/10/25  0900 07/10/25  1909 07/11/25  0327 07/11/25  1413   HGB 7.3* 7.4* 6.9* 8.4*   HCT 26.2*  --  24.5* 30.1*     Plan  - Transfuse PRBC if patient becomes hemodynamically unstable, symptomatic or H/H drops below 7/21.  - Patient has received 2 units of PRBCs on 7/8 and 1 unit of PRBC on 7/11  - Patient's anemia is currently stable for discharge  - Given a dose of IV iron.  May benefit from further IV iron transfusions in the outpatient setting

## 2025-07-11 NOTE — DISCHARGE SUMMARY
[facility] Medicine  Discharge Summary      Patient Name: Martín Thomason  MRN: [id number]  SONI: [id number]  Patient Class: IP- Inpatient  Admission Date: 7/8/2025  Hospital Length of Stay: 3 days  Discharge Date and Time: 07/11/2025 3:47 PM  Attending Physician: Marilu Gambino MD   Discharging Provider: Cody Tee DO  Primary Care Provider: Primo Culver MD  [facility] Medicine Team: [facility] 1 Cody Tee DO  Primary Care Team: [facility] 1    HPI:   59-year-old male presents to [facility] under the instruction of his PCP for blood transfusions secondary to anemia (Hgb < 7). He had a hemoglobin of 5.6 that was drawn 11 days ago, he says at this time he felt short of breath and very tired. He opted to take iron supplements and eat iron heavy foods. He reports to me that he has a history of internal hemorrhoids for which he noticed bright red blood in his stools about 3 weeks prior. He applied a rectal cream which he says helped alleviate his dyschezia and hematochezia. He denies any further episodes since. States that he has had several colonoscopies previously has never had any issues. He has never required a blood transfusion. He denies lightheadedness, chest pain, shortness of breath, fatigue. Of note, the patient has a colonoscopy scheduled for 7/10.     In the ED he presented with VSS, Hgb 6.6, al other lab work and imaging un-remarkable.     Procedure(s) (LRB):  EGD (ESOPHAGOGASTRODUODENOSCOPY) (N/A)  COLONOSCOPY (N/A)      Hospital Course:   59-year-old male presents to [facility] under the instruction of his PCP for blood transfusions secondary to anemia (Hgb < 7). He had a hemoglobin of 5.6 that was drawn 11 days ago, he says at this time he felt short of breath and very tired. In the ED he presented with VSS, Hgb 6.6, MCV 70, Fe 13, TIBC 471, transferrin 318, ferritin 12, retic 4.4. Patient received 2 units of PRBCs. Hgb after 2 units of PRBC increased to 7.2. Patient was  scheduled for colonoscopy outpatient for 7/10, but GI was consulted and will plan to do colonoscopy inpatient on 7/10. Colonoscopy showed partially obstructing tumor-lesion in the ascending colon highly concerning for malignancy which was biopsied and tattooed. C-scope also showed diverticulosis in the entire examined colon, and a single non-bleeding colonic angioectasia.  For colonic lesion, colorectal surgery team was consulted.  They recommended CT chest and abdomen/pelvis.  CTAP with IV contrast showed abnormal thickening of the ascending colon corresponding to site of recently identified colon neoplasm with adjacent mesenteric fat stranding and pathologically enlarged mesenteric lymph nodes adjacent to the ascending colon, and two ill-defined hypoattenuating lesions in the liver which were not definitively seen on remote prior CT exam, suspicious for metastatic disease. CEA minimally elevated at 8.4.   This morning CRS recommending MRI abdomen for further clarification of liver lesions. MRI unable to be obtained timely today and pt is eager to go home. MRI of abdomen scheduled outpatient- told by case management this will be done next Wednesday. Referral to Oncology outpatient.  This morning patient's hemoglobin is 6.9.  Decision to transfuse another 1 unit PRBC. Hgb > 8 on recheck. Patient hemodynamically stable and ready for discharge. Pt deemed appropriate for discharge. Plan discussed with pt, who was agreeable; medications were discussed and reviewed. Outpatient follow-up discussed and scheduled when able. ER precautions were given. Patient discharged.   Physical Exam  Vitals and nursing note reviewed.   Constitutional:       General: He is not in acute distress.     Appearance: Normal appearance. He is not ill-appearing.   HENT:      Head: Atraumatic.      Right Ear: External ear normal.      Left Ear: External ear normal.   Eyes:      Extraocular Movements: Extraocular movements intact.   Cardiovascular:       Rate and Rhythm: Normal rate and regular rhythm.      Pulses: Normal pulses.      Heart sounds: Normal heart sounds. No murmur heard.  Pulmonary:      Effort: Pulmonary effort is normal. No respiratory distress.      Breath sounds: Normal breath sounds.   Abdominal:      General: Abdomen is flat. Bowel sounds are normal.      Palpations: Abdomen is soft. There is no mass.      Tenderness: There is no abdominal tenderness. There is no guarding.   Musculoskeletal:         General: No tenderness. Normal range of motion.      Right lower leg: No edema.      Left lower leg: No edema.   Skin:     General: Skin is warm and dry.   Neurological:      General: No focal deficit present.      Mental Status: He is alert and oriented to person, place, and time.   Psychiatric:         Mood and Affect: Mood normal.         Behavior: Behavior normal.        Goals of Care Treatment Preferences:  Code Status: Full Code         Consults:   Consults (From admission, onward)          Status Ordering Provider     Inpatient consult to Colorectal Surgery  Once        Provider:  (Not yet assigned)    Completed ARUN MONTES     Inpatient consult to Gastroenterology  Once        Provider:  (Not yet assigned)    Completed ARUN MONTES            Assessment & Plan  Colonic mass  Anemia  Anemia is likely due to Iron deficiency. Most recent hemoglobin and hematocrit are listed below.  Recent Labs     07/10/25  0900 07/10/25  1909 07/11/25  0327 07/11/25  1413   HGB 7.3* 7.4* 6.9* 8.4*   HCT 26.2*  --  24.5* 30.1*     Colonoscopy performed on 7/10 showed malignant partially obstructing tumor in the ascending colon which was biopsied and tattooed, diverticulitis in the entire examined colon, and a single non-bleeding colonic angioectasia.     Plan  - Transfuse PRBC if patient becomes hemodynamically unstable, symptomatic or H/H drops below 7/21.  - Patient received transfusion 2 units of PRBCs on 07/08  - Repeat Iron studies  on    - Ferritin 12.0; TIBC 471; Fe 13 consistent with severe iron-deficiency anemia  - Patient with outpt colonoscopy scheduled on 7/10. 7/10 Colonoscopy showed partially obstructing tumor-lesion in the ascending colon highly concerning for malignancy which was biopsied and tattooed. C-scope also showed diverticulosis in the entire examined colon, and a single non-bleeding colonic angioectasia.  - colorectal surgery consulted.  CTAP with IV contrast showed abnormal thickening of the ascending colon corresponding to site of recently identified colon neoplasm with adjacent mesenteric fat stranding and pathologically enlarged mesenteric lymph nodes adjacent to the ascending colon,  and two ill-defined hypoattenuating lesions in the liver which were not definitively seen on remote prior CT exam, suspicious for metastatic disease.   - CEA mildly elevated at 8.4  - CRS now recommending MRI abdomen to further clarify liver lesions. This will be scheduled outpt we are told next week.   - Oncology referral outpatient to follow up on pathology results    Abnormal liver enzymes  Noted, no indication for investigation at this time    Orthostasis  Patient with history of orthostasis, however no signs/symptoms of this inpatient. CTM and low threshold for repeating orthostatics should suspicion arise.       Iron deficiency anemia  Anemia is likely due to Iron deficiency and possibly from occult LGIB. Most recent hemoglobin and hematocrit are listed below.  Recent Labs     07/10/25  0900 07/10/25  1909 07/11/25  0327 07/11/25  1413   HGB 7.3* 7.4* 6.9* 8.4*   HCT 26.2*  --  24.5* 30.1*     Plan  - Transfuse PRBC if patient becomes hemodynamically unstable, symptomatic or H/H drops below 7/21.  - Patient has received 2 units of PRBCs on 7/8 and 1 unit of PRBC on 7/11  - Patient's anemia is currently stable for discharge  - Given a dose of IV iron.  May benefit from further IV iron transfusions in the outpatient  setting  Hyponatremia    Recent Labs     07/09/25  0444 07/10/25  0217 07/11/25  0327    133* 134*     Plan   - treated with IV fluids, encouraged p.o. intake when able  - Patient hyponatremia is stable    Final Active Diagnoses:    Diagnosis Date Noted POA    PRINCIPAL PROBLEM:  Colonic mass [K63.89] 07/10/2025 Yes    Hyponatremia [E87.1] 07/10/2025 No    Iron deficiency anemia [D50.9] 07/08/2025 Yes    Orthostasis [I95.1] 06/27/2025 Yes    Anemia [D64.9] 04/21/2025 Yes    Abnormal liver enzymes [R74.8] 04/21/2025 Yes      Problems Resolved During this Admission:       Discharged Condition: fair    Disposition: Home or Self Care    Follow Up:  PCP  Oncology  GI  Colorectal surgery      Patient Instructions:      MRI Abdomen With Contrast   Standing Status: Future Standing Exp. Date: 07/11/26   Order Comments: To further characterize liver lesions seen on CT abd   Scheduling Instructions: Please attempt to schedule for next week     Order Specific Question Answer Comments   Does the patient have or ever had a pacemaker or a defibrillator (Note: Some facilities may not be able to schedule an MRI for patients with pacemakers and defibrillators. You should contact your local radiology dept to determine if this is the case.)? No    Does the patient have an aneurysm or surgical clip, pump, nerve/brain stimulator, middle/inner ear prosthesis, or other metal implant or foreign object (bullet, shrapnel)? If they have a card related to their implant, ask them to bring it. Issues related to the implant may cause the MRI to be delayed. No    Will the patient require po anxiolysis or sedation? No    May the Radiologist modify the order per protocol to meet the clinical needs of the patient? Yes    Does the patient have on a skin patch for medication with aluminized backing? No      Ambulatory referral/consult to Oncology   Standing Status: Future   Referral Priority: Routine Referral Type: Consultation   Referral Reason:  Specialty Services Required   Requested Specialty: Oncology   Number of Visits Requested: 1       Significant Diagnostic Studies: N/A    Pending Diagnostic Studies:       Procedure Component Value Units Date/Time    MRI Abdomen With Contrast [[id number]]     Order Status: Sent Lab Status: No result            Medications:  Reconciled Home Medications:      Medication List        CONTINUE taking these medications      colchicine 0.6 mg tablet  Commonly known as: COLCRYS  Take 0.6 mg by mouth once daily.              Indwelling Lines/Drains at time of discharge:   Lines/Drains/Airways       None                Time spent on the discharge of patient: 35 minutes         Cody Tee DO  Department of Hospital Medicine  [facility] Surg

## 2025-07-11 NOTE — ASSESSMENT & PLAN NOTE
Recent Labs     07/09/25  0444 07/10/25  0217 07/11/25  0327    133* 134*     Plan   - treated with IV fluids, encouraged p.o. intake when able  - Patient hyponatremia is stable

## 2025-07-11 NOTE — PROGRESS NOTES
[facility] Surg  Colorectal Surgery  Progress Note    Patient Name: Martín Thomason  MRN: [id number]  Admission Date: 7/8/2025  Hospital Length of Stay: 3 days  Attending Physician: Marilu Gambino MD    Subjective:     Interval History: passing gas, having non bloody soft formed stool, urinating, tolerating po, no abdominal pain    Post-Op Info:  Procedure(s) (LRB):  EGD (ESOPHAGOGASTRODUODENOSCOPY) (N/A)  COLONOSCOPY (N/A)   1 Day Post-Op      Medications:  Continuous Infusions:  Scheduled Meds:   k phos di & mono-sod phos mono  1 tablet Oral BID WM    magnesium sulfate 2 g IVPB  2 g Intravenous Once    mupirocin   Nasal BID     PRN Meds:   k phos di & mono-sod phos mono 250 mg tablet 1 tablet    magnesium sulfate 2g in water 50mL IVPB (premix)    mupirocin 2 % ointment        Objective:     Vital Signs (Most Recent):  Temp: 98 °F (36.7 °C) (07/11/25 0753)  Pulse: 96 (07/11/25 0753)  Resp: 17 (07/11/25 0753)  BP: (!) 165/86 (07/11/25 0753)  SpO2: 97 % (07/11/25 0753) Vital Signs (24h Range):  Temp:  [96.6 °F (35.9 °C)-99.3 °F (37.4 °C)] 98 °F (36.7 °C)  Pulse:  [] 96  Resp:  [17-20] 17  SpO2:  [96 %-100 %] 97 %  BP: (113-180)/(61-97) 165/86     Intake/Output - Last 3 Shifts         07/09 0700  07/10 0659 07/10 0700 07/11 0659 07/11 0700  07/12 0659    P.O.  240     Blood       IV Piggyback  1000     Total Intake(mL/kg)  1240 (10.7)     Net  +1240                     Physical Exam  Vitals reviewed.   Constitutional:       General: He is not in acute distress.     Appearance: Normal appearance. He is obese. He is not ill-appearing or toxic-appearing.   HENT:      Head: Normocephalic.      Mouth/Throat:      Mouth: Mucous membranes are moist.      Pharynx: Oropharynx is clear.   Eyes:      Extraocular Movements: Extraocular movements intact.      Conjunctiva/sclera: Conjunctivae normal.   Cardiovascular:      Rate and Rhythm: Normal rate and regular rhythm.   Pulmonary:      Effort: Pulmonary effort is  normal. No respiratory distress.   Abdominal:      General: There is no distension.      Palpations: Abdomen is soft.      Tenderness: There is no abdominal tenderness. There is no guarding.   Musculoskeletal:         General: No swelling or deformity.   Skin:     General: Skin is warm and dry.   Neurological:      General: No focal deficit present.      Mental Status: He is alert. Mental status is at baseline.   Psychiatric:         Mood and Affect: Mood normal.         Behavior: Behavior normal.         Thought Content: Thought content normal.         Judgment: Judgment normal.       Significant Labs:  CBC (Last 3 Results):   Recent Labs   Lab 07/09/25  2329 07/10/25  0900 07/10/25  1909 07/11/25  0327   WBC 9.42 8.09  --  7.50   RBC 3.72* 3.62*  --  3.37*   HGB 7.4* 7.3* 7.4* 6.9*   HCT 26.8* 26.2*  --  24.5*    277  --  250   MCV 72* 72*  --  73*   MCH 19.9* 20.2*  --  20.5*   MCHC 27.6* 27.9*  --  28.2*       Significant Diagnostics:  I have reviewed all pertinent imaging results/findings within the past 24 hours.    Assessment/Plan:     * Colonic mass  58yo M with ascending colon mass suspicious for malignancy. Duodenum and transverse colon with angioectasia ablated but was not actively bleeding. CEA 8.4 elevated, CT CAP with possible liver nodules, concern for abdominal LN, final read pending.  - recommend transfusing another 1u prbc for hgb 6.9  - will follow up with pathology results from cscope biopsy  - follow up with staging CT results  - no urgent surgery at this time, pending stabilization of Hgb        Natalie Bautista MD  Colorectal Surgery  [facility] Surg

## 2025-07-11 NOTE — ASSESSMENT & PLAN NOTE
Anemia is likely due to Iron deficiency. Most recent hemoglobin and hematocrit are listed below.  Recent Labs     07/10/25  0900 07/10/25  1909 07/11/25  0327 07/11/25  1413   HGB 7.3* 7.4* 6.9* 8.4*   HCT 26.2*  --  24.5* 30.1*     Colonoscopy performed on 7/10 showed malignant partially obstructing tumor in the ascending colon which was biopsied and tattooed, diverticulitis in the entire examined colon, and a single non-bleeding colonic angioectasia.     Plan  - Transfuse PRBC if patient becomes hemodynamically unstable, symptomatic or H/H drops below 7/21.  - Patient received transfusion 2 units of PRBCs on 07/08  - Repeat Iron studies on    - Ferritin 12.0; TIBC 471; Fe 13 consistent with severe iron-deficiency anemia  - Patient with outpt colonoscopy scheduled on 7/10. 7/10 Colonoscopy showed partially obstructing tumor-lesion in the ascending colon highly concerning for malignancy which was biopsied and tattooed. C-scope also showed diverticulosis in the entire examined colon, and a single non-bleeding colonic angioectasia.  - colorectal surgery consulted.  CTAP with IV contrast showed abnormal thickening of the ascending colon corresponding to site of recently identified colon neoplasm with adjacent mesenteric fat stranding and pathologically enlarged mesenteric lymph nodes adjacent to the ascending colon,  and two ill-defined hypoattenuating lesions in the liver which were not definitively seen on remote prior CT exam, suspicious for metastatic disease.   - CEA mildly elevated at 8.4  - CRS now recommending MRI abdomen to further clarify liver lesions. This will be scheduled outpt we are told next week.   - Oncology referral outpatient to follow up on pathology results

## 2025-07-14 ENCOUNTER — PATIENT OUTREACH (OUTPATIENT)
Dept: ADMINISTRATIVE | Facility: CLINIC | Age: 60
End: 2025-07-14
Payer: COMMERCIAL

## 2025-07-14 ENCOUNTER — RESULTS FOLLOW-UP (OUTPATIENT)
Dept: GASTROENTEROLOGY | Facility: CLINIC | Age: 60
End: 2025-07-14
Payer: COMMERCIAL

## 2025-07-14 DIAGNOSIS — C18.2 MALIGNANT NEOPLASM OF ASCENDING COLON: ICD-10-CM

## 2025-07-14 DIAGNOSIS — R97.20 ABNORMAL PROSTATE SPECIFIC ANTIGEN (PSA): Primary | ICD-10-CM

## 2025-07-14 PROBLEM — I95.1 ORTHOSTASIS: Status: RESOLVED | Noted: 2025-06-27 | Resolved: 2025-07-14

## 2025-07-14 NOTE — PLAN OF CARE
[facility] - Med Surg  Discharge Final Note    Primary Care Provider: Primo Culver MD    Expected Discharge Date: 7/11/2025    Final Discharge Note (most recent)       Final Note - 07/14/25 0835          Final Note    Assessment Type Final Discharge Note (P)      Anticipated Discharge Disposition Home or Self Care (P)      What phone number can be called within the next 1-3 days to see how you are doing after discharge? [phone] (P)      Hospital Resources/Appts/Education Provided Appointments scheduled and added to AVS (P)         Post-Acute Status    Discharge Delays None known at this time (P)                      Important Message from Medicare           Pt discharged home with spouse. CM completed follow up appts and added them to the AVS.  No further needs identified or expressed.    Discharge Plan A and Plan B have been determined by review of patient's clinical status, future medical and therapeutic needs, and coverage/benefits for post-acute care in coordination with multidisciplinary team members.     Kristina Helm, MSN   [facility]  [phone]

## 2025-07-14 NOTE — ASSESSMENT & PLAN NOTE
Prostate Specific Antigen (ng/mL)   Date Value   06/27/2025 11.44 (H)   **refer to urology for consideration of further evaluation

## 2025-07-14 NOTE — ASSESSMENT & PLAN NOTE
Colonoscopy 7/10/25:  Findings:       The perianal and digital rectal examinations were normal.        The terminal ileum appeared normal.        An ulcerated partially obstructing large mass was found in the        ascending colon. The mass was partially circumferential (involving        two-thirds of the lumen circumference). Oozing was present. This was        biopsied with a cold forceps for histology. Area just distal to the        mass was tattooed with an injection of SPOT.        Multiple large-mouthed and medium-mouthed diverticula were found in        the entire colon.        A single small angioectasia without bleeding was found in the        transverse colon. Coagulation for bleeding prevention using argon        plasma was successful.        No additional abnormalities were found on retroflexion.        No other large mass seen.   Impression:            - Preparation of the colon was poor.                          - The examined portion of the ileum was normal.                          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 Biopsied. Tattooed.                          - Diverticulosis in the entire examined colon.                          - A single non-bleeding colonic angioectasia.   Pathology:  Final Diagnosis   Colon, mass, biopsy:                Invasive adenocarcinoma, moderately differentiated     CT chest/abdomen/pelvis 7/11/25:  Impression:  Abnormal thickening of the ascending colon corresponding to site of recently identified colon neoplasm.  There is adjacent mesenteric fat stranding and pathologically enlarged mesenteric lymph nodes adjacent to the ascending colon.     Two ill-defined hypoattenuating lesions in the liver which were not definitively seen on remote prior CT exam, suspicious for metastatic disease.

## 2025-07-15 ENCOUNTER — TELEPHONE (OUTPATIENT)
Dept: HEMATOLOGY/ONCOLOGY | Facility: CLINIC | Age: 60
End: 2025-07-15
Payer: COMMERCIAL

## 2025-07-15 DIAGNOSIS — C18.2 MALIGNANT NEOPLASM OF ASCENDING COLON: Primary | ICD-10-CM

## 2025-07-15 LAB
DHEA SERPL-MCNC: NORMAL
ESTRIOL SERPL-MCNC: NORMAL NG/ML
ESTROGEN SERPL-MCNC: NORMAL PG/ML
INSULIN SERPL-ACNC: NORMAL U[IU]/ML
LAB AP CLINICAL INFORMATION: NORMAL
LAB AP GROSS DESCRIPTION: NORMAL
LAB AP PERFORMING LOCATION(S): NORMAL
LAB AP REPORT FOOTNOTES: NORMAL

## 2025-07-15 NOTE — TELEPHONE ENCOUNTER
Spoke to pt, pt to be scheduled on Fri 7/18 at 840am for hosp f/u appt with Dr Culver. Pt stated he confirmed this with someone else already.

## 2025-07-15 NOTE — PROGRESS NOTES
C3 nurse spoke with Martín Thomason (Spouse, Andrews) for a TCC post hospital discharge follow up call. The patient does not have a scheduled HOSFU appointment with Primo Culver MD within 5-7 days post hospital discharge date 07/11/2025. C3 nurse was unable to schedule HOSFU appointment in [facility].    Message sent to PCP staff requesting they contact patient and schedule follow up appointment.

## 2025-07-16 ENCOUNTER — TELEPHONE (OUTPATIENT)
Dept: HEMATOLOGY/ONCOLOGY | Facility: CLINIC | Age: 60
End: 2025-07-16
Payer: COMMERCIAL

## 2025-07-16 ENCOUNTER — HOSPITAL ENCOUNTER (OUTPATIENT)
Dept: RADIOLOGY | Facility: HOSPITAL | Age: 60
Discharge: HOME | End: 2025-07-16
Attending: STUDENT IN AN ORGANIZED HEALTH CARE EDUCATION/TRAINING PROGRAM
Payer: COMMERCIAL

## 2025-07-16 DIAGNOSIS — K63.89 COLONIC MASS: Primary | ICD-10-CM

## 2025-07-16 PROCEDURE — A9585 GADOBUTROL INJECTION: HCPCS | Performed by: STUDENT IN AN ORGANIZED HEALTH CARE EDUCATION/TRAINING PROGRAM

## 2025-07-16 PROCEDURE — 25500020 PHARM REV CODE 255: Performed by: STUDENT IN AN ORGANIZED HEALTH CARE EDUCATION/TRAINING PROGRAM

## 2025-07-16 PROCEDURE — 74183 MRI ABD W/O CNTR FLWD CNTR: CPT | Mod: TC

## 2025-07-16 RX ORDER — GADOBUTROL 604.72 MG/ML
10 INJECTION INTRAVENOUS
Status: COMPLETED | OUTPATIENT
Start: 2025-07-16 | End: 2025-07-16

## 2025-07-16 RX ADMIN — GADOBUTROL 10 ML: 604.72 INJECTION INTRAVENOUS at 08:07

## 2025-07-16 NOTE — NURSING
Oncology Navigation   Intake  Date of Diagnosis: 07/10/25  Cancer Type: GI  Type of Referral: Internal  Date of Referral: 07/11/25  Initial Nurse Navigator Contact: 07/11/25  Referral to Initial Contact Timeline (days): 0  First Appointment Available: 07/29/25  Appointment Date: 07/29/25 (Appt. changed today to 7/23/25 @ 11am per Dr. Machado request)  First Available Date vs. Scheduled Date (days): 0  Multiple appointments: Yes     Treatment                              Acuity      Follow Up  No follow-ups on file.

## 2025-07-17 PROBLEM — D64.9 ANEMIA: Status: RESOLVED | Noted: 2025-04-21 | Resolved: 2025-07-17

## 2025-07-17 PROBLEM — K63.89 COLONIC MASS: Status: RESOLVED | Noted: 2025-07-10 | Resolved: 2025-07-17

## 2025-07-17 PROBLEM — Z12.11 SCREENING FOR COLON CANCER: Status: RESOLVED | Noted: 2025-04-21 | Resolved: 2025-07-17

## 2025-07-17 NOTE — PROGRESS NOTES
"Total Care Appointment      Subjective:      Patient ID: Martín Thomason is a 59 y.o. male.    Chief Complaint: Follow-up      HPI:    History of Present Illness    CHIEF COMPLAINT:  Martín presents today for follow up after being diagnosed with iron deficiency anemia and colon cancer.    COLON CANCER:  He has a recent diagnosis of colon cancer with metastatic disease. Initial colonoscopy identified a non-obstructing mass in the ascending colon, diagnosed as adenocarcinoma. Molecular testing showed no loss of nuclear expression of MMR proteins, with low probability of MSI-H. He currently denies abdominal pain, reports normal bowel movements without straining, and appears asymptomatic with no systemic symptoms.    IRON DEFICIENCY ANEMIA:  He reports significant improvement in energy levels following blood transfusion for severe iron deficiency anemia. Initial hemoglobin was 5.6, requiring hospital admission for transfusion. Current hemoglobin is 9.5. He describes feeling more energetic and denies current symptoms of fatigue or weakness associated with prior anemic state.    CARDIOVASCULAR:  He has a history of sinus tachycardia during recent hospitalization, likely related to severe anemia. He tracks heart rate using Apple Watch, noting nighttime heart rate around 84 BPM. Blood pressure is 130/84.    PAST MEDICAL HISTORY:  He has a history of gout with two episodes affecting the elbow, triggered by consuming crawfish. He describes the episodes as extremely painful, characterized as feeling like "joint inflammation" and expresses strong desire to avoid future episodes.      ROS:  General: no fever, no chills, no fatigue, no weight gain, no weight loss, reports increased energy levels  Eyes: no vision changes, no redness, no discharge  ENT: no ear pain, no nasal congestion, no sore throat  Cardiovascular: no chest pain, no palpitations, no lower extremity edema  Respiratory: no cough, no shortness of " breath  Gastrointestinal: no abdominal pain, no nausea, no vomiting, no diarrhea, no constipation, no blood in stool, reports increased appetite  Genitourinary: no dysuria, no hematuria, no frequency  Musculoskeletal: no joint pain, no muscle pain  Skin: no rash, no lesion  Neurological: no headache, no dizziness, no numbness, no tingling  Psychiatric: no anxiety, no depression, no sleep difficulty             6/27/2025   PHQ-9 Depression Patient Health Questionnaire   Over the last two weeks how often have you been bothered by little interest or pleasure in doing things 0   Over the last two weeks how often have you been bothered by feeling down, depressed or hopeless 0           No data to display                  Objective:     PHYSICAL EXAM   Vital Signs  /84   Pulse 104   Temp 98.5 °F (36.9 °C)   Ht 6' (1.829 m)   Wt 123.4 kg (272 lb 0.8 oz)   SpO2 96%   BMI 36.90 kg/m²     Physical Exam    Vitals: Blood pressure: 130/84. Heart rate: 104.  General: Well-developed. Well-nourished. No acute distress.  Eyes: EOMI. Sclerae anicteric. Conjunctiva pale but improved.  HENT: Normocephalic. Atraumatic. Nares patent. Moist oral mucosa.  Cardiovascular: Regular rate. Regular rhythm. No murmurs. No rubs. No gallops. Normal S1, S2.  Respiratory: Normal respiratory effort. Clear to auscultation bilaterally. No rales. No rhonchi. No wheezing.  Musculoskeletal: No  obvious deformity.  Extremities: No lower extremity edema.  Neurological: Alert & oriented x3. No slurred speech. Normal gait.  Psychiatric: Normal mood. Normal affect. Good insight. Good judgment.  Skin: Warm. Dry. No rash.       Assessment:   59 y.o. male here for primary care visit       Plan:   1. Iron deficiency anemia due to chronic blood loss  Assessment & Plan:  Lab Results   Component Value Date    WBC 8.80 07/11/2025    HGB 8.4 (L) 07/11/2025    HCT 30.1 (L) 07/11/2025    MCV 75 (L) 07/11/2025     07/11/2025   --S/P admission; etiology  of KARINA was colon CA in ascending limb  --S/P transfusion with resolution of symptoms      2. Malignant neoplasm of ascending colon  Overview:  Stage IV colon adenocarcinoma, MMR-proficient    Assessment & Plan:  Immunohistochemistry for mismatch repair proteins has been ordered and will be reported in an addendum >> still pending    7/18/25 MRI abdomen:  Impression:  In this patient with history of colon cancer ascending colon mass is again seen, likely partially visualized and better characterized on CT chest abdomen pelvis 07/10/2025.  There are multiple lesions within the liver and a few vertebral bodies concerning for metastatic disease.    Immunohistochemistry (IHC) Testing for Mismatch Repair (MMR) Proteins  MLH1:  Intact nuclear expression   MSH2:  Intact nuclear expression   MSH6:  Intact nuclear expression   PMS2:  Intact nuclear expression   Background nonneoplastic tissue / internal control with intact nuclear expression   IHC Interpretation:  No loss of nuclear expression of MMR proteins: low probability of MSI-H    MMR-proficient status which means he will not be a candidate for immunotherapy  **has upcoming oncology appointment      3. Abnormal liver enzymes  Assessment & Plan:  Lab Results   Component Value Date    ALT 17 07/11/2025    AST 27 07/11/2025    ALKPHOS 64 07/11/2025    BILITOT 1.1 (H) 07/11/2025    ALBUMIN 2.8 (L) 07/11/2025     07/11/2025   --elevated liver enzymes from 1/2025 normalized; evaluation for possible etiology was negative except for elevated PETH level (patient did report coming back from a vacation where he had heavy EtOH use, which may have been the cause of the liver enzyme elevation)      4. Healthcare maintenance  Assessment & Plan:  **PCV20 today  **Shingles and RSV vaccine as soon as he can  **he adamantly declines COVID vaccination, saying he will wear a mask and has a supply of Paxlovid at home      5. History of gout  Assessment & Plan:  Had 2 bouts of acute  gout in his elbow  --he has colchicine that he takes prophylactically if he eats crawfish  **will check uric acid at some point        Follow up in about 3 months (around 10/18/2025).    Primo Culver MD/[facility]  Internal Medicine  [facility]

## 2025-07-17 NOTE — ASSESSMENT & PLAN NOTE
Lab Results   Component Value Date    ALT 17 07/11/2025    AST 27 07/11/2025    ALKPHOS 64 07/11/2025    BILITOT 1.1 (H) 07/11/2025    ALBUMIN 2.8 (L) 07/11/2025     07/11/2025   --elevated liver enzymes from 1/2025 normalized; evaluation for possible etiology was negative except for elevated PETH level (patient did report coming back from a vacation where he had heavy EtOH use, which may have been the cause of the liver enzyme elevation)

## 2025-07-17 NOTE — ASSESSMENT & PLAN NOTE
Immunohistochemistry for mismatch repair proteins has been ordered and will be reported in an addendum >> still pending    7/18/25 MRI abdomen:  Impression:  In this patient with history of colon cancer ascending colon mass is again seen, likely partially visualized and better characterized on CT chest abdomen pelvis 07/10/2025.  There are multiple lesions within the liver and a few vertebral bodies concerning for metastatic disease.    Immunohistochemistry (IHC) Testing for Mismatch Repair (MMR) Proteins  MLH1:  Intact nuclear expression   MSH2:  Intact nuclear expression   MSH6:  Intact nuclear expression   PMS2:  Intact nuclear expression   Background nonneoplastic tissue / internal control with intact nuclear expression   IHC Interpretation:  No loss of nuclear expression of MMR proteins: low probability of MSI-H    MMR-proficient status which means he will not be a candidate for immunotherapy  **has upcoming oncology appointment

## 2025-07-17 NOTE — ASSESSMENT & PLAN NOTE
Lab Results   Component Value Date    WBC 8.80 07/11/2025    HGB 8.4 (L) 07/11/2025    HCT 30.1 (L) 07/11/2025    MCV 75 (L) 07/11/2025     07/11/2025   --S/P admission; etiology of KARINA was colon CA in ascending limb  --S/P transfusion with resolution of symptoms

## 2025-07-18 ENCOUNTER — LAB VISIT (OUTPATIENT)
Dept: LAB | Facility: HOSPITAL | Age: 60
End: 2025-07-18
Attending: INTERNAL MEDICINE
Payer: COMMERCIAL

## 2025-07-18 ENCOUNTER — OFFICE VISIT (OUTPATIENT)
Dept: PRIMARY CARE CLINIC | Facility: CLINIC | Age: 60
End: 2025-07-18
Attending: INTERNAL MEDICINE
Payer: COMMERCIAL

## 2025-07-18 VITALS
BODY MASS INDEX: 36.85 KG/M2 | HEIGHT: 72 IN | HEART RATE: 104 BPM | OXYGEN SATURATION: 96 % | DIASTOLIC BLOOD PRESSURE: 84 MMHG | WEIGHT: 272.06 LBS | SYSTOLIC BLOOD PRESSURE: 130 MMHG | TEMPERATURE: 99 F

## 2025-07-18 DIAGNOSIS — K63.89 COLONIC MASS: ICD-10-CM

## 2025-07-18 DIAGNOSIS — D50.0 IRON DEFICIENCY ANEMIA DUE TO CHRONIC BLOOD LOSS: Primary | ICD-10-CM

## 2025-07-18 DIAGNOSIS — R74.8 ABNORMAL LIVER ENZYMES: ICD-10-CM

## 2025-07-18 DIAGNOSIS — C18.2 MALIGNANT NEOPLASM OF ASCENDING COLON: ICD-10-CM

## 2025-07-18 DIAGNOSIS — Z00.00 HEALTHCARE MAINTENANCE: ICD-10-CM

## 2025-07-18 DIAGNOSIS — Z87.39 HISTORY OF GOUT: ICD-10-CM

## 2025-07-18 LAB
ABSOLUTE EOSINOPHIL (OHS): 0.32 K/UL
ABSOLUTE MONOCYTE (OHS): 0.55 K/UL (ref 0.3–1)
ABSOLUTE NEUTROPHIL COUNT (OHS): 4.78 K/UL (ref 1.8–7.7)
ALBUMIN SERPL BCP-MCNC: 3.3 G/DL (ref 3.5–5.2)
ALP SERPL-CCNC: 77 UNIT/L (ref 40–150)
ALT SERPL W/O P-5'-P-CCNC: 22 UNIT/L (ref 10–44)
ANION GAP (OHS): 10 MMOL/L (ref 8–16)
AST SERPL-CCNC: 38 UNIT/L (ref 11–45)
BASOPHILS # BLD AUTO: 0.12 K/UL
BASOPHILS NFR BLD AUTO: 1.4 %
BILIRUB SERPL-MCNC: 0.9 MG/DL (ref 0.1–1)
BUN SERPL-MCNC: 9 MG/DL (ref 8–23)
CALCIUM SERPL-MCNC: 9 MG/DL (ref 8.7–10.5)
CARCINOEMBRYONIC ANTIGEN (OHS): 9 NG/ML
CHLORIDE SERPL-SCNC: 109 MMOL/L (ref 95–110)
CO2 SERPL-SCNC: 19 MMOL/L (ref 23–29)
CREAT SERPL-MCNC: 1 MG/DL (ref 0.5–1.4)
ERYTHROCYTE [DISTWIDTH] IN BLOOD BY AUTOMATED COUNT: 28 % (ref 11.5–14.5)
GFR SERPLBLD CREATININE-BSD FMLA CKD-EPI: >60 ML/MIN/1.73/M2
GLUCOSE SERPL-MCNC: 110 MG/DL (ref 70–110)
HCT VFR BLD AUTO: 34.4 % (ref 40–54)
HGB BLD-MCNC: 9.5 GM/DL (ref 14–18)
IMM GRANULOCYTES # BLD AUTO: 0.02 K/UL (ref 0–0.04)
IMM GRANULOCYTES NFR BLD AUTO: 0.2 % (ref 0–0.5)
LYMPHOCYTES # BLD AUTO: 2.58 K/UL (ref 1–4.8)
MCH RBC QN AUTO: 22.2 PG (ref 27–31)
MCHC RBC AUTO-ENTMCNC: 27.6 G/DL (ref 32–36)
MCV RBC AUTO: 80 FL (ref 82–98)
NUCLEATED RBC (/100WBC) (OHS): 0 /100 WBC
PLATELET # BLD AUTO: 446 K/UL (ref 150–450)
PLATELET BLD QL SMEAR: NORMAL
PMV BLD AUTO: 9.5 FL (ref 9.2–12.9)
POTASSIUM SERPL-SCNC: 4.3 MMOL/L (ref 3.5–5.1)
PROT SERPL-MCNC: 7.7 GM/DL (ref 6–8.4)
RBC # BLD AUTO: 4.28 M/UL (ref 4.6–6.2)
RELATIVE EOSINOPHIL (OHS): 3.8 %
RELATIVE LYMPHOCYTE (OHS): 30.8 % (ref 18–48)
RELATIVE MONOCYTE (OHS): 6.6 % (ref 4–15)
RELATIVE NEUTROPHIL (OHS): 57.2 % (ref 38–73)
SODIUM SERPL-SCNC: 138 MMOL/L (ref 136–145)
WBC # BLD AUTO: 8.37 K/UL (ref 3.9–12.7)

## 2025-07-18 PROCEDURE — 82947 ASSAY GLUCOSE BLOOD QUANT: CPT

## 2025-07-18 PROCEDURE — 36415 COLL VENOUS BLD VENIPUNCTURE: CPT

## 2025-07-18 PROCEDURE — 85025 COMPLETE CBC W/AUTO DIFF WBC: CPT

## 2025-07-18 PROCEDURE — 82378 CARCINOEMBRYONIC ANTIGEN: CPT

## 2025-07-18 NOTE — ASSESSMENT & PLAN NOTE
Had 2 bouts of acute gout in his elbow  --he has colchicine that he takes prophylactically if he eats crawfish  **will check uric acid at some point

## 2025-07-18 NOTE — ASSESSMENT & PLAN NOTE
**PCV20 today  **Shingles and RSV vaccine as soon as he can  **he adamantly declines COVID vaccination, saying he will wear a mask and has a supply of Paxlovid at home

## 2025-07-22 ENCOUNTER — TELEPHONE (OUTPATIENT)
Dept: HEMATOLOGY/ONCOLOGY | Facility: CLINIC | Age: 60
End: 2025-07-22
Payer: COMMERCIAL

## 2025-07-23 ENCOUNTER — OFFICE VISIT (OUTPATIENT)
Dept: HEMATOLOGY/ONCOLOGY | Facility: CLINIC | Age: 60
End: 2025-07-23
Payer: COMMERCIAL

## 2025-07-23 VITALS
BODY MASS INDEX: 36.54 KG/M2 | DIASTOLIC BLOOD PRESSURE: 78 MMHG | WEIGHT: 269.81 LBS | OXYGEN SATURATION: 97 % | SYSTOLIC BLOOD PRESSURE: 147 MMHG | HEART RATE: 106 BPM | TEMPERATURE: 98 F | RESPIRATION RATE: 16 BRPM | HEIGHT: 72 IN

## 2025-07-23 DIAGNOSIS — E66.01 CLASS 2 SEVERE OBESITY DUE TO EXCESS CALORIES WITH SERIOUS COMORBIDITY AND BODY MASS INDEX (BMI) OF 37.0 TO 37.9 IN ADULT: ICD-10-CM

## 2025-07-23 DIAGNOSIS — C18.2 MALIGNANT NEOPLASM OF ASCENDING COLON: Primary | ICD-10-CM

## 2025-07-23 DIAGNOSIS — C79.51 SECONDARY MALIGNANT NEOPLASM OF BONE: ICD-10-CM

## 2025-07-23 DIAGNOSIS — E66.812 CLASS 2 SEVERE OBESITY DUE TO EXCESS CALORIES WITH SERIOUS COMORBIDITY AND BODY MASS INDEX (BMI) OF 37.0 TO 37.9 IN ADULT: ICD-10-CM

## 2025-07-23 DIAGNOSIS — D50.0 IRON DEFICIENCY ANEMIA DUE TO CHRONIC BLOOD LOSS: ICD-10-CM

## 2025-07-23 DIAGNOSIS — C78.7 SECONDARY MALIGNANT NEOPLASM OF LIVER: ICD-10-CM

## 2025-07-23 PROCEDURE — 3078F DIAST BP <80 MM HG: CPT | Mod: CPTII,S$GLB,, | Performed by: INTERNAL MEDICINE

## 2025-07-23 PROCEDURE — 99999 PR PBB SHADOW E&M-EST. PATIENT-LVL IV: CPT | Mod: PBBFAC,,, | Performed by: INTERNAL MEDICINE

## 2025-07-23 PROCEDURE — 3077F SYST BP >= 140 MM HG: CPT | Mod: CPTII,S$GLB,, | Performed by: INTERNAL MEDICINE

## 2025-07-23 PROCEDURE — 99205 OFFICE O/P NEW HI 60 MIN: CPT | Mod: S$GLB,,, | Performed by: INTERNAL MEDICINE

## 2025-07-23 PROCEDURE — 3044F HG A1C LEVEL LT 7.0%: CPT | Mod: CPTII,S$GLB,, | Performed by: INTERNAL MEDICINE

## 2025-07-23 PROCEDURE — 1111F DSCHRG MED/CURRENT MED MERGE: CPT | Mod: CPTII,S$GLB,, | Performed by: INTERNAL MEDICINE

## 2025-07-23 PROCEDURE — 3008F BODY MASS INDEX DOCD: CPT | Mod: CPTII,S$GLB,, | Performed by: INTERNAL MEDICINE

## 2025-07-23 PROCEDURE — G2211 COMPLEX E/M VISIT ADD ON: HCPCS | Mod: S$GLB,,, | Performed by: INTERNAL MEDICINE

## 2025-07-23 PROCEDURE — 1159F MED LIST DOCD IN RCRD: CPT | Mod: CPTII,S$GLB,, | Performed by: INTERNAL MEDICINE

## 2025-07-23 NOTE — PROGRESS NOTES
GI MEDICAL ONCOLOGY    Reason for visit: Ascending colon cancer    Best Contact Phone Number(s): There are no phone numbers on file.     Cancer/Stage/TNM:    Cancer Staging   No matching staging information was found for the patient.       Oncology History   Malignant neoplasm of ascending colon   7/14/2025 Initial Diagnosis    Malignant neoplasm of ascending colon     8/6/2025 -  Chemotherapy    Treatment Summary   Plan Name: OP GI mFOLFOX6 (oxaliplatin leucovorin fluorouracil) with bevacizumab Q2W  Treatment Goal: Palliative  Status: Active  Start Date: 8/6/2025 (Planned)  End Date: 6/26/2026 (Planned)  Provider: Hayes Mendez MD  Chemotherapy: oxaliplatin (ELOXATIN) 85 mg/m2 = 212 mg in D5W 542.4 mL chemo infusion, 85 mg/m2 = 212 mg, Intravenous, Clinic/HOD 1 time, 0 of 24 cycles  fluorouracil (Adrucil) 2,400 mg/m2 = 5,975 mg in 0.9% NaCl 240 mL chemo infusion, 2,400 mg/m2 = 5,975 mg, Intravenous, Clinic/HOD 1 time, 0 of 24 cycles  bevacizumab-bvzr (ZIRABEV) 600 mg in 0.9% NaCl SolP 100 mL infusion, 5 mg/kg = 600 mg, Intravenous, Clinic/HOD 1 time, 0 of 24 cycles          HPI:   59 y.o. male with CKD, obesity who presents for further management of recently diagnosed ascending colon adenocarcinoma.  He initially presented with anemia, dyspnea, fatigue, and he was admitted to the emergency room on 7/8/25 with hemoglobin of 6.6 and received 2 units of PRBCs during hospitalization. Colonoscopy on 7/10/25 revealed a partially obstructing tumor in the ascending colon. CT chest, abdomen, and pelvis demonstrated abnormal thickening of the ascending colon, adjacent mesenteric fat stranding, mesenteric lymphadenopathy, and liver lesions concerning for metastasis. Biopsy from the ascending colon mass showed moderately differentiated invasive adenocarcinoma with intact mismatch repair proteins. MRI abdomen with/WO Contrast on 07/06/2025 confirmed the ascending colon mass, multiple liver lesions concerning for  "metastatic disease, and enhancing bone lesions in T7, T10, and L2 vertebral bodies concerning for metastatic disease.    He received a dose of IV Feraheme during hospitalization. He reports resolution of previous symptoms, denies fatigue or mobility issues, and his wife confirms decreased somnolence and improved ambulation. He has a good appetite and denies ongoing pain, hematochezia, or neurological symptoms related to bone lesions. He had a normal colonoscopy 5 years ago.  States his grandparents had "colon issues" but denies any first degree family members with any cancer history.    Works full time as airport consultant.          Patient presents with his wife.  ECOG PS is 0.  History has been obtained by chart review and discussion with the patient.    ROS:   Review of Systems   Respiratory:  Negative for cough and shortness of breath.    Cardiovascular:  Negative for chest pain.   Gastrointestinal:  Negative for abdominal pain and diarrhea.   Genitourinary:  Negative for frequency.   Musculoskeletal:  Negative for back pain.   Skin:  Negative for rash.   Neurological:  Negative for headaches.   Psychiatric/Behavioral:  The patient is not nervous/anxious.        Past Medical History:   No past medical history on file.     Past Surgical History:   Past Surgical History:   Procedure Laterality Date    COLONOSCOPY N/A 7/10/2025    Procedure: COLONOSCOPY;  Surgeon: Yamil Amor MD;  Location: [facility] ([address] [place] [address]);  Service: Endoscopy;  Laterality: N/A;    COLONOSCOPY, SCREENING, LOW RISK PATIENT N/A 7/3/2025    Procedure: COLONOSCOPY, SCREENING, LOW RISK PATIENT;  Surgeon: Christelle Valencia MD;  Location: [facility] ENDOSCOPY;  Service: Endoscopy;  Laterality: N/A;  7/1 pre call complete [facility]    ESOPHAGOGASTRODUODENOSCOPY N/A 7/10/2025    Procedure: EGD (ESOPHAGOGASTRODUODENOSCOPY);  Surgeon: Yamil Amor MD;  Location: [facility] ENDO ([address] [place] [address]);  Service: Endoscopy;  Laterality: N/A;    KNEE ARTHROPLASTY Right " 09/1998        Family History:   No family history on file.     Social History:   Social History     Tobacco Use    Smoking status: Never    Smokeless tobacco: Never   Substance Use Topics    Alcohol use: Yes     Alcohol/week: 12.0 standard drinks of alcohol     Types: 10 Glasses of wine, 2 Shots of liquor per week        I have reviewed and updated the patient's past medical, surgical, family and social histories.    Allergies:   Review of patient's allergies indicates:  No Known Allergies     Medications:   Current Medications[1]     Physical Exam:   BP (!) 147/78 (BP Location: Right arm, Patient Position: Sitting)   Pulse 106   Temp 98.4 °F (36.9 °C) (Oral)   Resp 16   Ht 6' (1.829 m)   Wt 122.4 kg (269 lb 13.5 oz)   SpO2 97%   BMI 36.60 kg/m²                Physical Exam  Vitals reviewed.   Constitutional:       General: He is not in acute distress.     Appearance: Normal appearance. He is obese.   Eyes:      General: No scleral icterus.     Extraocular Movements: Extraocular movements intact.      Conjunctiva/sclera: Conjunctivae normal.   Cardiovascular:      Rate and Rhythm: Normal rate.   Pulmonary:      Effort: Pulmonary effort is normal. No respiratory distress.   Abdominal:      Tenderness: There is no abdominal tenderness.   Musculoskeletal:         General: No swelling. Normal range of motion.   Skin:     General: Skin is warm.      Coloration: Skin is not jaundiced.      Findings: No erythema or rash.   Neurological:      General: No focal deficit present.      Mental Status: He is alert and oriented to person, place, and time. Mental status is at baseline.      Gait: Gait normal.   Psychiatric:         Mood and Affect: Mood normal.         Behavior: Behavior normal.           Labs:   No results found for this or any previous visit (from the past 48 hours).     7/18/25: Hgb 9.5 with MCV 80.  CEA 9.0.    Imaging:      MRI - 7/16/25:    Impression:     In this patient with history of colon cancer  ascending colon mass is again seen, likely partially visualized and better characterized on CT chest abdomen pelvis 07/10/2025.     There are multiple lesions within the liver and a few vertebral bodies concerning for metastatic disease.     Biliary sludge.     This report was flagged in Epic as abnormal.       Path:   Supplemental Report   Immunohistochemistry (IHC) Testing for Mismatch Repair (MMR) Proteins     MLH1:  Intact nuclear expression   MSH2:  Intact nuclear expression   MSH6:  Intact nuclear expression   PMS2:  Intact nuclear expression      Background nonneoplastic tissue / internal control with intact nuclear expression      IHC Interpretation:  No loss of nuclear expression of MMR proteins: low probability of MSI-H    Addendum electronically signed by Lorenzo Cisneros Jr., MD on 7/15/2025 at 1605    Final Diagnosis   Colon, mass, biopsy:                Invasive adenocarcinoma, moderately differentiated (see comment).           Assessment:       1. Malignant neoplasm of ascending colon    2. Secondary malignant neoplasm of liver    3. Secondary malignant neoplasm of bone    4. Iron deficiency anemia due to chronic blood loss    5. Class 2 severe obesity due to excess calories with serious comorbidity and body mass index (BMI) of 37.0 to 37.9 in adult          Plan:             # Ascending colon adenocarcinoma  Stage IV colon cancer with primary tumor in ascending colon, liver metastases and suspected bone involvement.  Discussed diagnosis, prognosis and treatment options.  pMMR.  Tempus ctDNA and tissue pending.  Recommended we proceed with palliative intent first-line FOLFOX + bevacizumab.  Has no obstructive symptoms at present.  Will need port placed. Will get PET/CT to assess avidity of bone lesions.  If these appear metastatic, not clear whether there will be a role for any consolidative treatment to liver but will assess response to chemotherapy.   He will follow with Dr. Sykes of  [facility].  Emphasized hydration, exercise, nutrition.    Consider bisphosphonate if bone mets are confirmed.    RTC in 1-2 weeks for chemo education prior to cycle 1 of FOLFOX + wong.    PGX pending.    # Iron deficiency anemia  - Anemia improved to hemoglobin 9.5 from 5.6 after transfusions and iron supplementation.  Monitor for need for repeat Feraheme.    # Obesity, CKD  Monitor renal function.  Discussed exercise recommendation.    Follow up: 2 weeks     The above information has been reviewed with the patient and all questions have been answered to their apparent satisfaction.  They understand that they can call the clinic with any questions.      60 minutes were spent face to face with the patient and his family to discuss the disease, natural history, treatment options and survival statistics. Greater than 50% of this time involved counseling or coordination of care. I have provided the patient with an opportunity to ask questions and have all questions answered to patient's satisfaction.      code applied: patient requires or will require a continuous, longitudinal, and active collaborative plan of care related to this patient's health condition, colon cancer --the management of which requires the direction of a practitioner with specialized clinical knowledge, skill, and expertise.       Hayes Mendez MD  Hematology/Oncology  [facility]    Route Chart for Scheduling    Med Onc Chart Routing  Urgent    Follow up with physician    Follow up with DEEPAK 1 week. for chemo education (needs PGX drawn when he comes asap)   Infusion scheduling note   FOLFOX + Avastin once approved   Injection scheduling note    Labs CBC, CMP, CEA, pharmacogenomics panel and urinalysis   Scheduling:  Preferred lab:  Lab interval:     Imaging PET scan   PET scan ASAP (ideally prior to cycle 1) but don't delay chemo for PET   Pharmacy appointment    Other referrals                Treatment Plan Information   OP  GI mFOLFOX6 (oxaliplatin leucovorin fluorouracil) with bevacizumab Q2W Hayes Mendez MD   Associated diagnosis: Malignant neoplasm of ascending colon   noted on 7/14/2025   Line of treatment: First Line  Treatment Goal: Palliative     Upcoming Treatment Dates - OP GI mFOLFOX6 (oxaliplatin leucovorin fluorouracil) with bevacizumab Q2W    8/6/2025       Chemotherapy       bevacizumab-bvzr (ZIRABEV) 600 mg in 0.9% NaCl SolP 100 mL infusion       oxaliplatin (ELOXATIN) 85 mg/m2 = 212 mg in D5W 542.4 mL chemo infusion       fluorouracil (Adrucil) 2,400 mg/m2 = 5,975 mg in 0.9% NaCl 240 mL chemo infusion       Antiemetics       palonosetron 0.25mg/dexAMETHasone 12mg in NS IVPB 0.25 mg 50 mL  8/20/2025       Chemotherapy       bevacizumab-bvzr (ZIRABEV) 600 mg in 0.9% NaCl SolP 100 mL infusion       oxaliplatin (ELOXATIN) 85 mg/m2 = 212 mg in D5W 542.4 mL chemo infusion       fluorouracil (Adrucil) 2,400 mg/m2 = 5,975 mg in 0.9% NaCl 240 mL chemo infusion       Antiemetics       palonosetron 0.25mg/dexAMETHasone 12mg in NS IVPB 0.25 mg 50 mL  9/3/2025       Chemotherapy       bevacizumab-bvzr (ZIRABEV) 600 mg in 0.9% NaCl SolP 100 mL infusion       oxaliplatin (ELOXATIN) 85 mg/m2 = 212 mg in D5W 542.4 mL chemo infusion       fluorouracil (Adrucil) 2,400 mg/m2 = 5,975 mg in 0.9% NaCl 240 mL chemo infusion       Antiemetics       palonosetron 0.25mg/dexAMETHasone 12mg in NS IVPB 0.25 mg 50 mL  9/17/2025       Chemotherapy       bevacizumab-bvzr (ZIRABEV) 600 mg in 0.9% NaCl SolP 100 mL infusion       oxaliplatin (ELOXATIN) 85 mg/m2 = 212 mg in D5W 542.4 mL chemo infusion       fluorouracil (Adrucil) 2,400 mg/m2 = 5,975 mg in 0.9% NaCl 240 mL chemo infusion       Antiemetics       palonosetron 0.25mg/dexAMETHasone 12mg in NS IVPB 0.25 mg 50 mL         [1]   Current Outpatient Medications   Medication Sig Dispense Refill    colchicine (COLCRYS) 0.6 mg tablet Take 0.6 mg by mouth once daily.       No current  facility-administered medications for this visit.

## 2025-07-28 NOTE — PROGRESS NOTES
CRS Office Visit History and Physical      SUBJECTIVE:     Chief Complaint: Colon mass    History of Present Illness:  The patient is new patient to this practice.   Course is as follows:  Patient is a 59 y.o. male presents for follow-up.  Seeing Dr Mendez.  Planning FOLFOX + bevacizumab.  Had PET yesterday, liver lesions + but not bone.  Feeling well.    Last Colonoscopy: 7/10/2025  - Preparation of the colon was poor.   - The examined portion of the ileum was normal.   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 Biopsied. Tattooed.   - Diverticulosis in the entire examined colon.   - A single non-bleeding colonic angioectasia. Treated with argon plasma coagulation (APC).   Pathology:  Invasive adenocarcinoma  MLH1:  Intact nuclear expression   MSH2:  Intact nuclear expression   MSH6:  Intact nuclear expression   PMS2:  Intact nuclear expression     Review of patient's allergies indicates:  No Known Allergies    No past medical history on file.  Past Surgical History:   Procedure Laterality Date    COLONOSCOPY N/A 7/10/2025    Procedure: COLONOSCOPY;  Surgeon: Yamil Amor MD;  Location: [facility] ([address] [place] [address]);  Service: Endoscopy;  Laterality: N/A;    COLONOSCOPY, SCREENING, LOW RISK PATIENT N/A 7/3/2025    Procedure: COLONOSCOPY, SCREENING, LOW RISK PATIENT;  Surgeon: Christelle Valencia MD;  Location: [facility] ENDOSCOPY;  Service: Endoscopy;  Laterality: N/A;  7/1 pre call complete [facility]    ESOPHAGOGASTRODUODENOSCOPY N/A 7/10/2025    Procedure: EGD (ESOPHAGOGASTRODUODENOSCOPY);  Surgeon: Yamil Amor MD;  Location: [facility] ([address] [place] [address]);  Service: Endoscopy;  Laterality: N/A;    KNEE ARTHROPLASTY Right 09/1998     No family history on file.  Social History[1]     Review of Systems:  ROS    OBJECTIVE:     Vital Signs (Most Recent)  BP (!) 149/98 (BP Location: Right arm, Patient Position: Sitting)   Pulse 109   Ht 6' (1.829 m)   Wt 122.7 kg (270 lb 8.1 oz)   BMI 36.69 kg/m²      Physical Exam:  General: Black or  male in no distress   Neuro: Alert and oriented x 4.  Moves all extremities.     HEENT: No icterus.  Trachea midline  Respiratory: Respirations are even and unlabored  Cardiac: Regular rate  Abdomen: Soft, non-distended  Extremities: Warm dry and intact  Skin: No rashes    Labs:   Lab Results   Component Value Date    CEA 9.0 (H) 07/18/2025     Lab Results   Component Value Date    WBC 8.37 07/18/2025    HGB 9.5 (L) 07/18/2025    HCT 34.4 (L) 07/18/2025    MCV 80 (L) 07/18/2025     07/18/2025       Imaging:   CT c/a/p (7/10/2025)  Abnormal thickening of the ascending colon corresponding to site of recently identified colon neoplasm.  There is adjacent mesenteric fat stranding and pathologically enlarged mesenteric lymph nodes adjacent to the ascending colon.  Two ill-defined hypoattenuating lesions in the liver which were not definitively seen on remote prior CT exam, suspicious for metastatic disease.    MRI abdomen (7/16/2025)  Liver: Normal size. No steatosis.  There are 2 enhancing and diffusion restricting hepatic lesions correlating with lesion seen on CT and concerning for metastatic disease.  For example ring-enhancing lesion within the left lobe of the liver measuring approximately 1.7 x 1.7 cm (series 11, image 34).  Ring-enhancing lesion within the right lobe measuring 2.4 x 2.2 cm (series 11, image 33).  Additional smaller subtle lesion in the right lobe (series 11, image 39).  Kidneys/Ureters: Normal enhancement.  No mass or hydroureteronephrosis.  GI Tract/Mesentery (imaged portion): Short segment of wall thickening in the ascending colon is only partially imaged, better characterized on CT chest abdomen pelvis 07/10/2025.  Diverticulosis of the colon without evidence of acute diverticulitis.  Bones: Enhancing lesions are seen within the T7, T10, L2 vertebral bodies, concerning for metastases.    PET (7/30/2025)  Circumferential wall  thickening and increased metabolic activity of the ascending colon near the hepatic flexure which corresponds to known colon neoplasm.  There is adjacent fat stranding and associated enlarged lymph nodes.  Two hypermetabolic foci in the liver which correspond to lesions seen on recent MRI and CT of the abdomen, concerning for metastatic disease.    ** I have reviewed these images **    ASSESSMENT/PLAN:     60yo M w/ ascending colon adenocarcinoma metastatic to liver    Agree with neoadjuvant chemotherapy.  Will message Dr Mendez about presenting at liver tumor board and [facility] referral.      Marilyn Sykes MD  Staff Surgeon  Colon & Rectal Surgery           [1]   Social History  Tobacco Use    Smoking status: Never     Passive exposure: Never    Smokeless tobacco: Never   Substance Use Topics    Alcohol use: Yes     Alcohol/week: 12.0 standard drinks of alcohol     Types: 10 Glasses of wine, 2 Shots of liquor per week    Drug use: Never

## 2025-07-30 ENCOUNTER — HOSPITAL ENCOUNTER (OUTPATIENT)
Dept: RADIOLOGY | Facility: HOSPITAL | Age: 60
Discharge: HOME | End: 2025-07-30
Attending: INTERNAL MEDICINE
Payer: COMMERCIAL

## 2025-07-30 DIAGNOSIS — C18.2 MALIGNANT NEOPLASM OF ASCENDING COLON: ICD-10-CM

## 2025-07-30 DIAGNOSIS — C18.2 MALIGNANT NEOPLASM OF ASCENDING COLON: Primary | ICD-10-CM

## 2025-07-30 PROCEDURE — A9552 F18 FDG: HCPCS | Performed by: INTERNAL MEDICINE

## 2025-07-30 PROCEDURE — 78815 PET IMAGE W/CT SKULL-THIGH: CPT | Mod: 26,PI,, | Performed by: NUCLEAR MEDICINE

## 2025-07-30 PROCEDURE — 78815 PET IMAGE W/CT SKULL-THIGH: CPT | Mod: TC

## 2025-07-30 RX ORDER — FLUDEOXYGLUCOSE F18 500 MCI/ML
12.25 INJECTION INTRAVENOUS
Status: COMPLETED | OUTPATIENT
Start: 2025-07-30 | End: 2025-07-30

## 2025-07-30 RX ADMIN — FLUDEOXYGLUCOSE F-18 12.25 MILLICURIE: 500 INJECTION INTRAVENOUS at 01:07

## 2025-07-31 ENCOUNTER — TELEPHONE (OUTPATIENT)
Dept: SURGERY | Facility: CLINIC | Age: 60
End: 2025-07-31
Payer: COMMERCIAL

## 2025-07-31 ENCOUNTER — OFFICE VISIT (OUTPATIENT)
Dept: SURGERY | Facility: CLINIC | Age: 60
End: 2025-07-31
Attending: COLON & RECTAL SURGERY
Payer: COMMERCIAL

## 2025-07-31 ENCOUNTER — TELEPHONE (OUTPATIENT)
Dept: INTERVENTIONAL RADIOLOGY/VASCULAR | Facility: CLINIC | Age: 60
End: 2025-07-31
Payer: COMMERCIAL

## 2025-07-31 VITALS
WEIGHT: 270.5 LBS | HEART RATE: 109 BPM | BODY MASS INDEX: 36.64 KG/M2 | HEIGHT: 72 IN | SYSTOLIC BLOOD PRESSURE: 149 MMHG | DIASTOLIC BLOOD PRESSURE: 98 MMHG

## 2025-07-31 DIAGNOSIS — C18.2 MALIGNANT NEOPLASM OF ASCENDING COLON: Primary | ICD-10-CM

## 2025-07-31 PROCEDURE — 1111F DSCHRG MED/CURRENT MED MERGE: CPT | Mod: CPTII,S$GLB,, | Performed by: COLON & RECTAL SURGERY

## 2025-07-31 PROCEDURE — 99999 PR PBB SHADOW E&M-EST. PATIENT-LVL III: CPT | Mod: PBBFAC,,, | Performed by: COLON & RECTAL SURGERY

## 2025-07-31 PROCEDURE — 1160F RVW MEDS BY RX/DR IN RCRD: CPT | Mod: CPTII,S$GLB,, | Performed by: COLON & RECTAL SURGERY

## 2025-07-31 PROCEDURE — 3008F BODY MASS INDEX DOCD: CPT | Mod: CPTII,S$GLB,, | Performed by: COLON & RECTAL SURGERY

## 2025-07-31 PROCEDURE — 99214 OFFICE O/P EST MOD 30 MIN: CPT | Mod: S$GLB,,, | Performed by: COLON & RECTAL SURGERY

## 2025-07-31 PROCEDURE — 3080F DIAST BP >= 90 MM HG: CPT | Mod: CPTII,S$GLB,, | Performed by: COLON & RECTAL SURGERY

## 2025-07-31 PROCEDURE — 3077F SYST BP >= 140 MM HG: CPT | Mod: CPTII,S$GLB,, | Performed by: COLON & RECTAL SURGERY

## 2025-07-31 PROCEDURE — 1159F MED LIST DOCD IN RCRD: CPT | Mod: CPTII,S$GLB,, | Performed by: COLON & RECTAL SURGERY

## 2025-07-31 PROCEDURE — 3044F HG A1C LEVEL LT 7.0%: CPT | Mod: CPTII,S$GLB,, | Performed by: COLON & RECTAL SURGERY

## 2025-07-31 NOTE — Clinical Note
Hey! I saw that his PET only lit up in the liver.  I agree with neoadjuvant therapy.  Hopefully can do combo liver ablation/resection with his colon in the future.  Were you going to list for liver tumor board and send to [facility]urg[facility]?  Thanks! Camille

## 2025-08-04 ENCOUNTER — TELEPHONE (OUTPATIENT)
Dept: INTERVENTIONAL RADIOLOGY/VASCULAR | Facility: CLINIC | Age: 60
End: 2025-08-04
Payer: COMMERCIAL

## 2025-08-04 ENCOUNTER — TELEPHONE (OUTPATIENT)
Dept: UROLOGY | Facility: CLINIC | Age: 60
End: 2025-08-04

## 2025-08-05 ENCOUNTER — PATIENT MESSAGE (OUTPATIENT)
Dept: INTERVENTIONAL RADIOLOGY/VASCULAR | Facility: HOSPITAL | Age: 60
End: 2025-08-05
Payer: COMMERCIAL

## 2025-08-05 ENCOUNTER — TELEPHONE (OUTPATIENT)
Dept: HEMATOLOGY/ONCOLOGY | Facility: CLINIC | Age: 60
End: 2025-08-05
Payer: COMMERCIAL

## 2025-08-05 ENCOUNTER — TELEPHONE (OUTPATIENT)
Dept: INTERVENTIONAL RADIOLOGY/VASCULAR | Facility: CLINIC | Age: 60
End: 2025-08-05
Payer: COMMERCIAL

## 2025-08-05 ENCOUNTER — TELEPHONE (OUTPATIENT)
Dept: UROLOGY | Facility: CLINIC | Age: 60
End: 2025-08-05
Payer: COMMERCIAL

## 2025-08-06 ENCOUNTER — PATIENT MESSAGE (OUTPATIENT)
Dept: INTERVENTIONAL RADIOLOGY/VASCULAR | Facility: HOSPITAL | Age: 60
End: 2025-08-06
Payer: COMMERCIAL

## 2025-08-06 ENCOUNTER — TELEPHONE (OUTPATIENT)
Dept: INFUSION THERAPY | Facility: HOSPITAL | Age: 60
End: 2025-08-06
Payer: COMMERCIAL

## 2025-08-06 ENCOUNTER — PATIENT MESSAGE (OUTPATIENT)
Dept: INFUSION THERAPY | Facility: HOSPITAL | Age: 60
End: 2025-08-06
Payer: COMMERCIAL

## 2025-08-06 ENCOUNTER — TELEPHONE (OUTPATIENT)
Dept: UROLOGY | Facility: CLINIC | Age: 60
End: 2025-08-06
Payer: COMMERCIAL

## 2025-08-06 NOTE — TELEPHONE ENCOUNTER
I called the pt to go over there chemo Schedule. No answer. I left a voicemail with my number for the pt to call me back.

## 2025-08-11 ENCOUNTER — TELEPHONE (OUTPATIENT)
Dept: INTERVENTIONAL RADIOLOGY/VASCULAR | Facility: HOSPITAL | Age: 60
End: 2025-08-11
Payer: COMMERCIAL

## 2025-08-12 ENCOUNTER — HOSPITAL ENCOUNTER (OUTPATIENT)
Dept: INTERVENTIONAL RADIOLOGY/VASCULAR | Facility: HOSPITAL | Age: 60
Discharge: HOME | End: 2025-08-12
Attending: INTERNAL MEDICINE
Payer: COMMERCIAL

## 2025-08-12 VITALS
OXYGEN SATURATION: 100 % | TEMPERATURE: 99 F | HEIGHT: 72 IN | DIASTOLIC BLOOD PRESSURE: 95 MMHG | SYSTOLIC BLOOD PRESSURE: 153 MMHG | WEIGHT: 250 LBS | BODY MASS INDEX: 33.86 KG/M2 | RESPIRATION RATE: 11 BRPM | HEART RATE: 99 BPM

## 2025-08-12 DIAGNOSIS — C18.9 COLON CANCER: ICD-10-CM

## 2025-08-12 DIAGNOSIS — C18.2 MALIGNANT NEOPLASM OF ASCENDING COLON: Primary | ICD-10-CM

## 2025-08-12 DIAGNOSIS — C18.2 MALIGNANT NEOPLASM OF ASCENDING COLON: ICD-10-CM

## 2025-08-12 PROCEDURE — 94760 N-INVAS EAR/PLS OXIMETRY 1: CPT

## 2025-08-12 PROCEDURE — C1788 PORT, INDWELLING, IMP: HCPCS

## 2025-08-12 PROCEDURE — 99900035 HC TECH TIME PER 15 MIN (STAT)

## 2025-08-12 PROCEDURE — 63600175 PHARM REV CODE 636 W HCPCS: Performed by: STUDENT IN AN ORGANIZED HEALTH CARE EDUCATION/TRAINING PROGRAM

## 2025-08-12 PROCEDURE — C1894 INTRO/SHEATH, NON-LASER: HCPCS

## 2025-08-12 RX ORDER — LIDOCAINE HYDROCHLORIDE 10 MG/ML
1 INJECTION, SOLUTION EPIDURAL; INFILTRATION; INTRACAUDAL; PERINEURAL ONCE
Status: DISCONTINUED | OUTPATIENT
Start: 2025-08-12 | End: 2025-08-13 | Stop reason: HOSPADM

## 2025-08-12 RX ORDER — HEPARIN SODIUM 1000 [USP'U]/ML
INJECTION, SOLUTION INTRAVENOUS; SUBCUTANEOUS
Status: COMPLETED | OUTPATIENT
Start: 2025-08-12 | End: 2025-08-12

## 2025-08-12 RX ORDER — SODIUM CHLORIDE 9 MG/ML
INJECTION, SOLUTION INTRAVENOUS CONTINUOUS
Status: DISCONTINUED | OUTPATIENT
Start: 2025-08-12 | End: 2025-08-13 | Stop reason: HOSPADM

## 2025-08-12 RX ORDER — LIDOCAINE HYDROCHLORIDE 20 MG/ML
INJECTION, SOLUTION INFILTRATION; PERINEURAL
Status: COMPLETED | OUTPATIENT
Start: 2025-08-12 | End: 2025-08-12

## 2025-08-12 RX ORDER — MIDAZOLAM HYDROCHLORIDE 1 MG/ML
INJECTION, SOLUTION INTRAMUSCULAR; INTRAVENOUS
Status: COMPLETED | OUTPATIENT
Start: 2025-08-12 | End: 2025-08-12

## 2025-08-12 RX ORDER — FENTANYL CITRATE 50 UG/ML
INJECTION, SOLUTION INTRAMUSCULAR; INTRAVENOUS
Status: COMPLETED | OUTPATIENT
Start: 2025-08-12 | End: 2025-08-12

## 2025-08-12 RX ADMIN — FENTANYL CITRATE 50 MCG: 50 INJECTION, SOLUTION INTRAMUSCULAR; INTRAVENOUS at 02:08

## 2025-08-12 RX ADMIN — MIDAZOLAM HYDROCHLORIDE 1 MG: 1 INJECTION, SOLUTION INTRAMUSCULAR; INTRAVENOUS at 02:08

## 2025-08-12 RX ADMIN — HEPARIN SODIUM 500 UNITS: 1000 INJECTION, SOLUTION INTRAVENOUS; SUBCUTANEOUS at 02:08

## 2025-08-12 RX ADMIN — LIDOCAINE HYDROCHLORIDE 20 ML: 20 INJECTION, SOLUTION INFILTRATION; PERINEURAL at 01:08

## 2025-08-14 ENCOUNTER — DOCUMENTATION ONLY (OUTPATIENT)
Dept: HEMATOLOGY/ONCOLOGY | Facility: CLINIC | Age: 60
End: 2025-08-14
Payer: COMMERCIAL

## 2025-08-15 ENCOUNTER — LAB VISIT (OUTPATIENT)
Dept: LAB | Facility: HOSPITAL | Age: 60
End: 2025-08-15
Attending: INTERNAL MEDICINE
Payer: COMMERCIAL

## 2025-08-15 ENCOUNTER — CLINICAL SUPPORT (OUTPATIENT)
Dept: HEMATOLOGY/ONCOLOGY | Facility: CLINIC | Age: 60
End: 2025-08-15
Payer: COMMERCIAL

## 2025-08-15 VITALS
BODY MASS INDEX: 37.22 KG/M2 | HEART RATE: 108 BPM | OXYGEN SATURATION: 97 % | SYSTOLIC BLOOD PRESSURE: 148 MMHG | RESPIRATION RATE: 17 BRPM | WEIGHT: 274.81 LBS | HEIGHT: 72 IN | TEMPERATURE: 99 F | DIASTOLIC BLOOD PRESSURE: 86 MMHG

## 2025-08-15 DIAGNOSIS — D49.9 IMMUNODEFICIENCY SECONDARY TO NEOPLASM: ICD-10-CM

## 2025-08-15 DIAGNOSIS — D84.821 IMMUNODEFICIENCY SECONDARY TO CHEMOTHERAPY: ICD-10-CM

## 2025-08-15 DIAGNOSIS — C79.51 SECONDARY MALIGNANT NEOPLASM OF BONE: ICD-10-CM

## 2025-08-15 DIAGNOSIS — K63.89 COLONIC MASS: ICD-10-CM

## 2025-08-15 DIAGNOSIS — C18.2 MALIGNANT NEOPLASM OF ASCENDING COLON: Primary | ICD-10-CM

## 2025-08-15 DIAGNOSIS — T45.1X5A IMMUNODEFICIENCY SECONDARY TO CHEMOTHERAPY: ICD-10-CM

## 2025-08-15 DIAGNOSIS — C78.7 SECONDARY MALIGNANT NEOPLASM OF LIVER: ICD-10-CM

## 2025-08-15 DIAGNOSIS — D84.81 IMMUNODEFICIENCY SECONDARY TO NEOPLASM: ICD-10-CM

## 2025-08-15 DIAGNOSIS — C18.2 MALIGNANT NEOPLASM OF ASCENDING COLON: ICD-10-CM

## 2025-08-15 DIAGNOSIS — Z79.69 IMMUNODEFICIENCY SECONDARY TO CHEMOTHERAPY: ICD-10-CM

## 2025-08-15 LAB
ABSOLUTE NEUTROPHIL COUNT (OHS): 4.51 K/UL (ref 1.8–7.7)
ALBUMIN SERPL BCP-MCNC: 3.5 G/DL (ref 3.5–5.2)
ALP SERPL-CCNC: 76 UNIT/L (ref 40–150)
ALT SERPL W/O P-5'-P-CCNC: 35 UNIT/L (ref 0–55)
ANION GAP (OHS): 10 MMOL/L (ref 8–16)
AST SERPL-CCNC: 69 UNIT/L (ref 0–50)
BILIRUB SERPL-MCNC: 0.7 MG/DL (ref 0.1–1)
BUN SERPL-MCNC: 9 MG/DL (ref 8–23)
CALCIUM SERPL-MCNC: 8.7 MG/DL (ref 8.7–10.5)
CARCINOEMBRYONIC ANTIGEN (OHS): 10.2 NG/ML
CHLORIDE SERPL-SCNC: 106 MMOL/L (ref 95–110)
CO2 SERPL-SCNC: 22 MMOL/L (ref 23–29)
CREAT SERPL-MCNC: 0.9 MG/DL (ref 0.5–1.4)
ERYTHROCYTE [DISTWIDTH] IN BLOOD BY AUTOMATED COUNT: 20.6 % (ref 11.5–14.5)
GFR SERPLBLD CREATININE-BSD FMLA CKD-EPI: >60 ML/MIN/1.73/M2
GLUCOSE SERPL-MCNC: 133 MG/DL (ref 70–110)
HCT VFR BLD AUTO: 28.3 % (ref 40–54)
HGB BLD-MCNC: 8.4 GM/DL (ref 14–18)
IMM GRANULOCYTES # BLD AUTO: 0.02 K/UL (ref 0–0.04)
MAGNESIUM SERPL-MCNC: 1.3 MG/DL (ref 1.6–2.6)
MCH RBC QN AUTO: 23.1 PG (ref 27–31)
MCHC RBC AUTO-ENTMCNC: 29.7 G/DL (ref 32–36)
MCV RBC AUTO: 78 FL (ref 82–98)
PLATELET # BLD AUTO: 195 K/UL (ref 150–450)
PMV BLD AUTO: 9.4 FL (ref 9.2–12.9)
POTASSIUM SERPL-SCNC: 3.6 MMOL/L (ref 3.5–5.1)
PROT SERPL-MCNC: 8.1 GM/DL (ref 6–8.4)
RBC # BLD AUTO: 3.63 M/UL (ref 4.6–6.2)
SODIUM SERPL-SCNC: 138 MMOL/L (ref 136–145)
WBC # BLD AUTO: 7.65 K/UL (ref 3.9–12.7)

## 2025-08-15 PROCEDURE — 83735 ASSAY OF MAGNESIUM: CPT

## 2025-08-15 PROCEDURE — 99999 PR PBB SHADOW E&M-EST. PATIENT-LVL IV: CPT | Mod: PBBFAC,,, | Performed by: REGISTERED NURSE

## 2025-08-15 PROCEDURE — 82040 ASSAY OF SERUM ALBUMIN: CPT

## 2025-08-15 PROCEDURE — 85027 COMPLETE CBC AUTOMATED: CPT

## 2025-08-15 PROCEDURE — 36415 COLL VENOUS BLD VENIPUNCTURE: CPT

## 2025-08-15 PROCEDURE — 82378 CARCINOEMBRYONIC ANTIGEN: CPT

## 2025-08-15 RX ORDER — PROCHLORPERAZINE MALEATE 10 MG
10 TABLET ORAL EVERY 6 HOURS PRN
Qty: 30 TABLET | Refills: 5 | Status: SHIPPED | OUTPATIENT
Start: 2025-08-15

## 2025-08-15 RX ORDER — OLANZAPINE 5 MG/1
TABLET, FILM COATED ORAL
Qty: 15 TABLET | Refills: 2 | Status: SHIPPED | OUTPATIENT
Start: 2025-08-15

## 2025-08-15 RX ORDER — LIDOCAINE AND PRILOCAINE 25; 25 MG/G; MG/G
CREAM TOPICAL
Qty: 30 G | Refills: 4 | Status: SHIPPED | OUTPATIENT
Start: 2025-08-15

## 2025-08-18 ENCOUNTER — PATIENT MESSAGE (OUTPATIENT)
Dept: HEMATOLOGY/ONCOLOGY | Facility: CLINIC | Age: 60
End: 2025-08-18
Payer: COMMERCIAL

## 2025-08-18 ENCOUNTER — INFUSION (OUTPATIENT)
Dept: INFUSION THERAPY | Facility: HOSPITAL | Age: 60
End: 2025-08-18
Payer: COMMERCIAL

## 2025-08-18 VITALS
TEMPERATURE: 99 F | DIASTOLIC BLOOD PRESSURE: 89 MMHG | OXYGEN SATURATION: 98 % | BODY MASS INDEX: 37.56 KG/M2 | HEART RATE: 100 BPM | RESPIRATION RATE: 20 BRPM | HEIGHT: 72 IN | WEIGHT: 277.31 LBS | SYSTOLIC BLOOD PRESSURE: 164 MMHG

## 2025-08-18 DIAGNOSIS — C18.2 MALIGNANT NEOPLASM OF ASCENDING COLON: Primary | ICD-10-CM

## 2025-08-18 PROCEDURE — 96417 CHEMO IV INFUS EACH ADDL SEQ: CPT

## 2025-08-18 PROCEDURE — 96416 CHEMO PROLONG INFUSE W/PUMP: CPT

## 2025-08-18 PROCEDURE — 25000003 PHARM REV CODE 250: Performed by: INTERNAL MEDICINE

## 2025-08-18 PROCEDURE — 96415 CHEMO IV INFUSION ADDL HR: CPT

## 2025-08-18 PROCEDURE — 96413 CHEMO IV INFUSION 1 HR: CPT

## 2025-08-18 PROCEDURE — 96367 TX/PROPH/DG ADDL SEQ IV INF: CPT

## 2025-08-18 PROCEDURE — 63600175 PHARM REV CODE 636 W HCPCS: Mod: JZ,TB | Performed by: INTERNAL MEDICINE

## 2025-08-18 RX ORDER — SODIUM CHLORIDE 0.9 % (FLUSH) 0.9 %
10 SYRINGE (ML) INJECTION
Status: DISCONTINUED | OUTPATIENT
Start: 2025-08-18 | End: 2025-08-18 | Stop reason: HOSPADM

## 2025-08-18 RX ORDER — SODIUM CHLORIDE 0.9 % (FLUSH) 0.9 %
10 SYRINGE (ML) INJECTION
Status: CANCELLED | OUTPATIENT
Start: 2025-08-18

## 2025-08-18 RX ORDER — HEPARIN 100 UNIT/ML
500 SYRINGE INTRAVENOUS
Status: DISCONTINUED | OUTPATIENT
Start: 2025-08-18 | End: 2025-08-18 | Stop reason: HOSPADM

## 2025-08-18 RX ORDER — PROCHLORPERAZINE EDISYLATE 5 MG/ML
10 INJECTION INTRAMUSCULAR; INTRAVENOUS ONCE AS NEEDED
Status: CANCELLED | OUTPATIENT
Start: 2025-08-18

## 2025-08-18 RX ORDER — PROCHLORPERAZINE EDISYLATE 5 MG/ML
10 INJECTION INTRAMUSCULAR; INTRAVENOUS ONCE AS NEEDED
Status: DISCONTINUED | OUTPATIENT
Start: 2025-08-18 | End: 2025-08-18 | Stop reason: HOSPADM

## 2025-08-18 RX ORDER — HEPARIN 100 UNIT/ML
500 SYRINGE INTRAVENOUS
Status: CANCELLED | OUTPATIENT
Start: 2025-08-18

## 2025-08-18 RX ORDER — EPINEPHRINE 0.3 MG/.3ML
0.3 INJECTION SUBCUTANEOUS ONCE AS NEEDED
Status: DISCONTINUED | OUTPATIENT
Start: 2025-08-18 | End: 2025-08-18 | Stop reason: HOSPADM

## 2025-08-18 RX ORDER — DIPHENHYDRAMINE HYDROCHLORIDE 50 MG/ML
50 INJECTION, SOLUTION INTRAMUSCULAR; INTRAVENOUS ONCE AS NEEDED
Status: DISCONTINUED | OUTPATIENT
Start: 2025-08-18 | End: 2025-08-18 | Stop reason: HOSPADM

## 2025-08-18 RX ADMIN — OXALIPLATIN 212 MG: 5 INJECTION, SOLUTION INTRAVENOUS at 10:08

## 2025-08-18 RX ADMIN — FLUOROURACIL 5975 MG: 50 INJECTION, SOLUTION INTRAVENOUS at 12:08

## 2025-08-18 RX ADMIN — BEVACIZUMAB-AWWB 600 MG: 400 INJECTION, SOLUTION INTRAVENOUS at 09:08

## 2025-08-18 RX ADMIN — DEXAMETHASONE SODIUM PHOSPHATE 0.25 MG: 4 INJECTION, SOLUTION INTRA-ARTICULAR; INTRALESIONAL; INTRAMUSCULAR; INTRAVENOUS; SOFT TISSUE at 09:08

## 2025-08-18 RX ADMIN — SODIUM CHLORIDE: 9 INJECTION, SOLUTION INTRAVENOUS at 08:08

## 2025-08-19 ENCOUNTER — TELEPHONE (OUTPATIENT)
Dept: HEMATOLOGY/ONCOLOGY | Facility: CLINIC | Age: 60
End: 2025-08-19
Payer: COMMERCIAL

## 2025-08-20 ENCOUNTER — PATIENT MESSAGE (OUTPATIENT)
Dept: HEMATOLOGY/ONCOLOGY | Facility: CLINIC | Age: 60
End: 2025-08-20
Payer: COMMERCIAL

## 2025-08-20 ENCOUNTER — TELEPHONE (OUTPATIENT)
Dept: HEMATOLOGY/ONCOLOGY | Facility: CLINIC | Age: 60
End: 2025-08-20
Payer: COMMERCIAL

## 2025-08-20 ENCOUNTER — INFUSION (OUTPATIENT)
Dept: INFUSION THERAPY | Facility: HOSPITAL | Age: 60
End: 2025-08-20
Payer: COMMERCIAL

## 2025-08-20 DIAGNOSIS — T45.1X5A CINV (CHEMOTHERAPY-INDUCED NAUSEA AND VOMITING): ICD-10-CM

## 2025-08-20 DIAGNOSIS — C18.2 MALIGNANT NEOPLASM OF ASCENDING COLON: Primary | ICD-10-CM

## 2025-08-20 DIAGNOSIS — G62.0 CHEMOTHERAPY-INDUCED NEUROPATHY: ICD-10-CM

## 2025-08-20 DIAGNOSIS — T45.1X5A CHEMOTHERAPY-INDUCED NEUROPATHY: ICD-10-CM

## 2025-08-20 DIAGNOSIS — R11.2 CINV (CHEMOTHERAPY-INDUCED NAUSEA AND VOMITING): ICD-10-CM

## 2025-08-20 PROCEDURE — A4216 STERILE WATER/SALINE, 10 ML: HCPCS | Performed by: INTERNAL MEDICINE

## 2025-08-20 PROCEDURE — 63600175 PHARM REV CODE 636 W HCPCS: Performed by: INTERNAL MEDICINE

## 2025-08-20 PROCEDURE — 25000003 PHARM REV CODE 250: Performed by: INTERNAL MEDICINE

## 2025-08-20 RX ORDER — SODIUM CHLORIDE 0.9 % (FLUSH) 0.9 %
10 SYRINGE (ML) INJECTION
Status: DISCONTINUED | OUTPATIENT
Start: 2025-08-20 | End: 2025-08-20 | Stop reason: HOSPADM

## 2025-08-20 RX ORDER — HEPARIN 100 UNIT/ML
500 SYRINGE INTRAVENOUS
Status: DISCONTINUED | OUTPATIENT
Start: 2025-08-20 | End: 2025-08-20 | Stop reason: HOSPADM

## 2025-08-20 RX ORDER — PROCHLORPERAZINE EDISYLATE 5 MG/ML
10 INJECTION INTRAMUSCULAR; INTRAVENOUS ONCE AS NEEDED
Status: DISCONTINUED | OUTPATIENT
Start: 2025-08-20 | End: 2025-08-20 | Stop reason: HOSPADM

## 2025-08-20 RX ADMIN — HEPARIN 500 UNITS: 100 SYRINGE at 11:08

## 2025-08-20 RX ADMIN — SODIUM CHLORIDE, PRESERVATIVE FREE 10 ML: 5 INJECTION INTRAVENOUS at 11:08

## 2025-08-22 DIAGNOSIS — C18.2 MALIGNANT NEOPLASM OF ASCENDING COLON: ICD-10-CM

## 2025-08-22 RX ORDER — OLANZAPINE 5 MG/1
TABLET, FILM COATED ORAL
Qty: 90 TABLET | Refills: 2 | Status: SHIPPED | OUTPATIENT
Start: 2025-08-22

## 2025-09-04 ENCOUNTER — INFUSION (OUTPATIENT)
Dept: INFUSION THERAPY | Facility: HOSPITAL | Age: 60
End: 2025-09-04
Payer: COMMERCIAL

## 2025-09-04 ENCOUNTER — OFFICE VISIT (OUTPATIENT)
Dept: HEMATOLOGY/ONCOLOGY | Facility: CLINIC | Age: 60
End: 2025-09-04
Payer: COMMERCIAL

## 2025-09-04 VITALS — DIASTOLIC BLOOD PRESSURE: 87 MMHG | HEART RATE: 104 BPM | SYSTOLIC BLOOD PRESSURE: 166 MMHG | RESPIRATION RATE: 18 BRPM

## 2025-09-04 VITALS
DIASTOLIC BLOOD PRESSURE: 83 MMHG | SYSTOLIC BLOOD PRESSURE: 162 MMHG | HEIGHT: 72 IN | WEIGHT: 280.63 LBS | OXYGEN SATURATION: 100 % | RESPIRATION RATE: 17 BRPM | HEART RATE: 107 BPM | BODY MASS INDEX: 38.01 KG/M2 | TEMPERATURE: 98 F

## 2025-09-04 DIAGNOSIS — D50.0 IRON DEFICIENCY ANEMIA DUE TO CHRONIC BLOOD LOSS: ICD-10-CM

## 2025-09-04 DIAGNOSIS — T45.1X5A IMMUNODEFICIENCY SECONDARY TO CHEMOTHERAPY: ICD-10-CM

## 2025-09-04 DIAGNOSIS — D84.821 IMMUNODEFICIENCY SECONDARY TO CHEMOTHERAPY: ICD-10-CM

## 2025-09-04 DIAGNOSIS — C18.2 MALIGNANT NEOPLASM OF ASCENDING COLON: Primary | ICD-10-CM

## 2025-09-04 DIAGNOSIS — C79.51 SECONDARY MALIGNANT NEOPLASM OF BONE: ICD-10-CM

## 2025-09-04 DIAGNOSIS — D84.81 IMMUNODEFICIENCY SECONDARY TO NEOPLASM: ICD-10-CM

## 2025-09-04 DIAGNOSIS — E66.01 CLASS 2 SEVERE OBESITY DUE TO EXCESS CALORIES WITH SERIOUS COMORBIDITY AND BODY MASS INDEX (BMI) OF 37.0 TO 37.9 IN ADULT: ICD-10-CM

## 2025-09-04 DIAGNOSIS — Z79.69 IMMUNODEFICIENCY SECONDARY TO CHEMOTHERAPY: ICD-10-CM

## 2025-09-04 DIAGNOSIS — E66.812 CLASS 2 SEVERE OBESITY DUE TO EXCESS CALORIES WITH SERIOUS COMORBIDITY AND BODY MASS INDEX (BMI) OF 37.0 TO 37.9 IN ADULT: ICD-10-CM

## 2025-09-04 DIAGNOSIS — C78.7 SECONDARY MALIGNANT NEOPLASM OF LIVER: ICD-10-CM

## 2025-09-04 DIAGNOSIS — D49.9 IMMUNODEFICIENCY SECONDARY TO NEOPLASM: ICD-10-CM

## 2025-09-04 PROCEDURE — 96367 TX/PROPH/DG ADDL SEQ IV INF: CPT

## 2025-09-04 PROCEDURE — 96416 CHEMO PROLONG INFUSE W/PUMP: CPT

## 2025-09-04 PROCEDURE — 25000003 PHARM REV CODE 250: Performed by: REGISTERED NURSE

## 2025-09-04 PROCEDURE — 99999 PR PBB SHADOW E&M-EST. PATIENT-LVL IV: CPT | Mod: PBBFAC,,, | Performed by: REGISTERED NURSE

## 2025-09-04 PROCEDURE — 96417 CHEMO IV INFUS EACH ADDL SEQ: CPT

## 2025-09-04 PROCEDURE — 96413 CHEMO IV INFUSION 1 HR: CPT

## 2025-09-04 PROCEDURE — 63600175 PHARM REV CODE 636 W HCPCS: Performed by: REGISTERED NURSE

## 2025-09-04 RX ORDER — SODIUM CHLORIDE 0.9 % (FLUSH) 0.9 %
10 SYRINGE (ML) INJECTION
Status: CANCELLED | OUTPATIENT
Start: 2025-09-04

## 2025-09-04 RX ORDER — LANOLIN ALCOHOL/MO/W.PET/CERES
400 CREAM (GRAM) TOPICAL
Status: COMPLETED | OUTPATIENT
Start: 2025-09-04 | End: 2025-09-04

## 2025-09-04 RX ORDER — SODIUM CHLORIDE 0.9 % (FLUSH) 0.9 %
10 SYRINGE (ML) INJECTION
Status: CANCELLED | OUTPATIENT
Start: 2025-09-05

## 2025-09-04 RX ORDER — EPINEPHRINE 0.3 MG/.3ML
0.3 INJECTION SUBCUTANEOUS ONCE AS NEEDED
Status: DISCONTINUED | OUTPATIENT
Start: 2025-09-04 | End: 2025-09-04 | Stop reason: HOSPADM

## 2025-09-04 RX ORDER — SODIUM CHLORIDE 0.9 % (FLUSH) 0.9 %
10 SYRINGE (ML) INJECTION
Status: DISCONTINUED | OUTPATIENT
Start: 2025-09-04 | End: 2025-09-04 | Stop reason: HOSPADM

## 2025-09-04 RX ORDER — HEPARIN 100 UNIT/ML
500 SYRINGE INTRAVENOUS
Status: CANCELLED | OUTPATIENT
Start: 2025-09-04

## 2025-09-04 RX ORDER — PROCHLORPERAZINE EDISYLATE 5 MG/ML
10 INJECTION INTRAMUSCULAR; INTRAVENOUS ONCE AS NEEDED
Status: CANCELLED | OUTPATIENT
Start: 2025-09-04

## 2025-09-04 RX ORDER — HEPARIN 100 UNIT/ML
500 SYRINGE INTRAVENOUS
Status: DISCONTINUED | OUTPATIENT
Start: 2025-09-04 | End: 2025-09-04 | Stop reason: HOSPADM

## 2025-09-04 RX ORDER — EPINEPHRINE 0.3 MG/.3ML
0.3 INJECTION SUBCUTANEOUS ONCE AS NEEDED
Status: CANCELLED | OUTPATIENT
Start: 2025-09-04 | End: 2037-01-31

## 2025-09-04 RX ORDER — DIPHENHYDRAMINE HYDROCHLORIDE 50 MG/ML
50 INJECTION, SOLUTION INTRAMUSCULAR; INTRAVENOUS ONCE AS NEEDED
Status: DISCONTINUED | OUTPATIENT
Start: 2025-09-04 | End: 2025-09-04 | Stop reason: HOSPADM

## 2025-09-04 RX ORDER — HEPARIN 100 UNIT/ML
500 SYRINGE INTRAVENOUS
Status: CANCELLED | OUTPATIENT
Start: 2025-09-05

## 2025-09-04 RX ORDER — PROCHLORPERAZINE EDISYLATE 5 MG/ML
10 INJECTION INTRAMUSCULAR; INTRAVENOUS ONCE AS NEEDED
Status: CANCELLED | OUTPATIENT
Start: 2025-09-05

## 2025-09-04 RX ORDER — DIPHENHYDRAMINE HYDROCHLORIDE 50 MG/ML
50 INJECTION, SOLUTION INTRAMUSCULAR; INTRAVENOUS ONCE AS NEEDED
Status: CANCELLED | OUTPATIENT
Start: 2025-09-04 | End: 2037-01-31

## 2025-09-04 RX ORDER — PROCHLORPERAZINE EDISYLATE 5 MG/ML
10 INJECTION INTRAMUSCULAR; INTRAVENOUS ONCE AS NEEDED
Status: DISCONTINUED | OUTPATIENT
Start: 2025-09-04 | End: 2025-09-04 | Stop reason: HOSPADM

## 2025-09-04 RX ORDER — LANOLIN ALCOHOL/MO/W.PET/CERES
400 CREAM (GRAM) TOPICAL
Status: CANCELLED | OUTPATIENT
Start: 2025-09-04 | End: 2025-09-04

## 2025-09-04 RX ADMIN — DEXTROSE MONOHYDRATE: 50 INJECTION, SOLUTION INTRAVENOUS at 03:09

## 2025-09-04 RX ADMIN — FLUOROURACIL 5975 MG: 50 INJECTION, SOLUTION INTRAVENOUS at 05:09

## 2025-09-04 RX ADMIN — Medication 400 MG: at 01:09

## 2025-09-04 RX ADMIN — DEXAMETHASONE SODIUM PHOSPHATE 0.25 MG: 4 INJECTION, SOLUTION INTRA-ARTICULAR; INTRALESIONAL; INTRAMUSCULAR; INTRAVENOUS; SOFT TISSUE at 02:09

## 2025-09-04 RX ADMIN — OXALIPLATIN 212 MG: 5 INJECTION, SOLUTION INTRAVENOUS at 03:09

## 2025-09-04 RX ADMIN — BEVACIZUMAB-AWWB 600 MG: 400 INJECTION, SOLUTION INTRAVENOUS at 02:09

## 2025-09-04 RX ADMIN — SODIUM CHLORIDE: 9 INJECTION, SOLUTION INTRAVENOUS at 02:09

## 2025-09-05 ENCOUNTER — TELEPHONE (OUTPATIENT)
Dept: HEMATOLOGY/ONCOLOGY | Facility: CLINIC | Age: 60
End: 2025-09-05
Payer: COMMERCIAL

## 2025-09-06 ENCOUNTER — INFUSION (OUTPATIENT)
Dept: INFUSION THERAPY | Facility: HOSPITAL | Age: 60
End: 2025-09-06
Payer: COMMERCIAL

## 2025-09-06 VITALS
DIASTOLIC BLOOD PRESSURE: 84 MMHG | SYSTOLIC BLOOD PRESSURE: 152 MMHG | HEIGHT: 72 IN | WEIGHT: 280.63 LBS | RESPIRATION RATE: 17 BRPM | BODY MASS INDEX: 38.01 KG/M2 | HEART RATE: 95 BPM

## 2025-09-06 DIAGNOSIS — C18.2 MALIGNANT NEOPLASM OF ASCENDING COLON: Primary | ICD-10-CM

## 2025-09-06 PROCEDURE — 63600175 PHARM REV CODE 636 W HCPCS: Performed by: REGISTERED NURSE

## 2025-09-06 PROCEDURE — A4216 STERILE WATER/SALINE, 10 ML: HCPCS | Performed by: REGISTERED NURSE

## 2025-09-06 PROCEDURE — 25000003 PHARM REV CODE 250: Performed by: REGISTERED NURSE

## 2025-09-06 RX ORDER — SODIUM CHLORIDE 0.9 % (FLUSH) 0.9 %
10 SYRINGE (ML) INJECTION
Status: DISCONTINUED | OUTPATIENT
Start: 2025-09-06 | End: 2025-09-06 | Stop reason: HOSPADM

## 2025-09-06 RX ORDER — PROCHLORPERAZINE EDISYLATE 5 MG/ML
10 INJECTION INTRAMUSCULAR; INTRAVENOUS ONCE AS NEEDED
Status: DISCONTINUED | OUTPATIENT
Start: 2025-09-06 | End: 2025-09-06 | Stop reason: HOSPADM

## 2025-09-06 RX ORDER — HEPARIN 100 UNIT/ML
500 SYRINGE INTRAVENOUS
Status: DISCONTINUED | OUTPATIENT
Start: 2025-09-06 | End: 2025-09-06 | Stop reason: HOSPADM

## 2025-09-06 RX ADMIN — HEPARIN 500 UNITS: 100 SYRINGE at 11:09

## 2025-09-06 RX ADMIN — Medication 10 ML: at 11:09
